# Patient Record
Sex: FEMALE | Race: WHITE | Employment: OTHER | URBAN - NONMETROPOLITAN AREA
[De-identification: names, ages, dates, MRNs, and addresses within clinical notes are randomized per-mention and may not be internally consistent; named-entity substitution may affect disease eponyms.]

---

## 2017-12-01 ENCOUNTER — HOSPITAL ENCOUNTER (EMERGENCY)
Age: 62
Discharge: HOME | End: 2017-12-01
Attending: EMERGENCY MEDICINE
Payer: COMMERCIAL

## 2017-12-01 ENCOUNTER — APPOINTMENT (OUTPATIENT)
Dept: GENERAL RADIOLOGY | Age: 62
End: 2017-12-01
Payer: COMMERCIAL

## 2017-12-01 VITALS
OXYGEN SATURATION: 99 % | TEMPERATURE: 98 F | SYSTOLIC BLOOD PRESSURE: 148 MMHG | DIASTOLIC BLOOD PRESSURE: 92 MMHG | RESPIRATION RATE: 18 BRPM | WEIGHT: 200 LBS | HEART RATE: 84 BPM

## 2017-12-01 DIAGNOSIS — I10 ESSENTIAL HYPERTENSION: ICD-10-CM

## 2017-12-01 DIAGNOSIS — S82.402A CLOSED FRACTURE OF SHAFT OF LEFT FIBULA, UNSPECIFIED FRACTURE MORPHOLOGY, INITIAL ENCOUNTER: Primary | ICD-10-CM

## 2017-12-01 PROCEDURE — 99283 EMERGENCY DEPT VISIT LOW MDM: CPT

## 2017-12-01 PROCEDURE — 6360000002 HC RX W HCPCS: Performed by: EMERGENCY MEDICINE

## 2017-12-01 PROCEDURE — 73610 X-RAY EXAM OF ANKLE: CPT

## 2017-12-01 PROCEDURE — 90715 TDAP VACCINE 7 YRS/> IM: CPT | Performed by: EMERGENCY MEDICINE

## 2017-12-01 PROCEDURE — 90471 IMMUNIZATION ADMIN: CPT | Performed by: EMERGENCY MEDICINE

## 2017-12-01 RX ORDER — VENLAFAXINE HYDROCHLORIDE 37.5 MG/1
37.5 CAPSULE, EXTENDED RELEASE ORAL DAILY
COMMUNITY

## 2017-12-01 RX ORDER — VALACYCLOVIR HYDROCHLORIDE 500 MG/1
500 TABLET, FILM COATED ORAL DAILY
COMMUNITY

## 2017-12-01 RX ORDER — HYDROCODONE BITARTRATE AND ACETAMINOPHEN 5; 325 MG/1; MG/1
1 TABLET ORAL EVERY 6 HOURS PRN
Qty: 10 TABLET | Refills: 0 | Status: SHIPPED | OUTPATIENT
Start: 2017-12-01 | End: 2017-12-08

## 2017-12-01 RX ORDER — CYCLOBENZAPRINE HCL 10 MG
10 TABLET ORAL DAILY
COMMUNITY

## 2017-12-01 RX ORDER — NAPROXEN 500 MG/1
500 TABLET ORAL 2 TIMES DAILY WITH MEALS
COMMUNITY

## 2017-12-01 RX ADMIN — TETANUS TOXOID, REDUCED DIPHTHERIA TOXOID AND ACELLULAR PERTUSSIS VACCINE, ADSORBED 0.5 ML: 5; 2.5; 8; 8; 2.5 SUSPENSION INTRAMUSCULAR at 08:36

## 2017-12-01 ASSESSMENT — PAIN DESCRIPTION - FREQUENCY: FREQUENCY: CONTINUOUS

## 2017-12-01 ASSESSMENT — PAIN DESCRIPTION - ORIENTATION: ORIENTATION: LEFT

## 2017-12-01 ASSESSMENT — PAIN SCALES - GENERAL: PAINLEVEL_OUTOF10: 10

## 2017-12-01 ASSESSMENT — PAIN DESCRIPTION - PAIN TYPE: TYPE: ACUTE PAIN

## 2017-12-01 ASSESSMENT — PAIN DESCRIPTION - LOCATION: LOCATION: ANKLE

## 2017-12-01 ASSESSMENT — PAIN DESCRIPTION - DESCRIPTORS: DESCRIPTORS: ACHING;SHARP

## 2017-12-01 NOTE — ED PROVIDER NOTES
CURRENT MEDICATIONS       Previous Medications    No medications on file       ALLERGIES     Review of patient's allergies indicates no known allergies. FAMILY HISTORY     History reviewed. No pertinent family history. SOCIAL HISTORY       Social History     Social History    Marital status: N/A     Spouse name: N/A    Number of children: N/A    Years of education: N/A     Social History Main Topics    Smoking status: Never Smoker    Smokeless tobacco: Never Used    Alcohol use Yes      Comment: social    Drug use: No    Sexual activity: Not Asked     Other Topics Concern    None     Social History Narrative    None       SCREENINGS             PHYSICAL EXAM    (up to 7 for level 4, 8 or more for level 5)     ED Triage Vitals [12/01/17 0816]   BP Temp Temp Source Pulse Resp SpO2 Height Weight   (!) 182/98 98 °F (36.7 °C) Tympanic 84 18 99 % -- 200 lb (90.7 kg)       Physical Exam  Reveals an alert white female whose blood pressure is elevated at 182/98. Other vital signs are stable. She has not had injury about her head or neck. This is superficial abrasion to the right forearm in the area of the mid ulna. No tenderness or swelling over the elbow or wrist.  Good range of motion is present. Left ankle demonstrates some bruising anteriorly as well as over the lateral malleolus. She has some minor tenderness over the medial malleolus. Achilles tendon and calcaneus are normal.  She does not have tenderness over the foot including the proximal fifth metatarsal.  Neurovascular function is normal.  No injury or significant tenderness over the left knee or left hip. I don't feel x-rays of the left ankle are needed. Ice will be applied patient was given a tetanus shot is greater than 10 years since her last one.   DIAGNOSTIC RESULTS     EKG: All EKG's are interpreted by the Emergency Department Physician who either signs or Co-signs this chart in the absence of a cardiologist.    No EKGs indicated    RADIOLOGY:   Non-plain film images such as CT, Ultrasound and MRI are read by the radiologist. Plain radiographic images are visualized and preliminarily interpreted by the emergency physician with the below findings:    X-ray of the left ankle was read by the radiologist and demonstrated nondisplaced fracture of the distal fibula. I reviewed the films as well. Interpretation per the Radiologist below, if available at the time of this note:    XR ANKLE LEFT (MIN 3 VIEWS)    (Results Pending)         ED BEDSIDE ULTRASOUND:   Performed by ED Physician - none    LABS:  Labs Reviewed - No data to display    All other labs were within normal range or not returned as of this dictation. EMERGENCY DEPARTMENT COURSE and DIFFERENTIAL DIAGNOSIS/MDM:   Vitals:    Vitals:    12/01/17 0816   BP: (!) 182/98   Pulse: 84   Resp: 18   Temp: 98 °F (36.7 °C)   TempSrc: Tympanic   SpO2: 99%   Weight: 200 lb (90.7 kg)       Patient presents with a nondisplaced fracture of her distal fibula. Dr. Roopa Kerr is on call and the patient is fine following up with him. I will place the patient in a posterior splint here was to be applied by the nurses. She is given crutches as well. She was sent home with Vicodin to use for pain. She is given a note for work as her job entails standing all day long. [facility]  ED Course          CONSULTS:  None    PROCEDURES:A posterior splint was applied to the patient's left lower extremities by the nurses. Neurovascular function was intact at discharge. Unless otherwise noted below, none     Procedures    FINAL IMPRESSION    Acute nondisplaced fracture distal fibula  Abrasion of right forearm      DISPOSITION/PLAN   DISPOSITION     PATIENT REFERRED TO:  No follow-up provider specified.     DISCHARGE MEDICATIONS:  New Prescriptions    No medications on file              Summation      Patient Course:  See above H&P    ED Medications administered this visit:  Medications - No data to display    New Prescriptions from this visit:    New Prescriptions    No medications on file       Follow-up:  No follow-up provider specified.       Final Impression: See above diagnosis           (Please note that portions of this note were completed with a voice recognition program.  Efforts were made to edit the dictations but occasionally words are mis-transcribed.)      Natalie Max MD (electronically signed)  Attending Emergency Physician         Natalie Martin MD  12/01/17 1037

## 2017-12-01 NOTE — LETTER
[facility]  [address] [address]  Phone: [phone]  Fax: [phone]             December 1, 2017    Patient: Liliana Marcano   YOB: 1955   Date of Visit: 12/1/2017       To Whom It May Concern:    Liliana Marcano was seen and treated in our emergency department on 12/1/2017. She is to be off work until cleared by an orthopedic surgeon.       Sincerely,             Signature:__________________________________

## 2021-11-29 ENCOUNTER — OFFICE VISIT (OUTPATIENT)
Dept: CARDIOLOGY | Age: 66
End: 2021-11-29
Payer: MEDICARE

## 2021-11-29 ENCOUNTER — HOSPITAL ENCOUNTER (OUTPATIENT)
Dept: NON INVASIVE DIAGNOSTICS | Age: 66
Discharge: HOME | End: 2021-11-29
Payer: MEDICARE

## 2021-11-29 ENCOUNTER — HOSPITAL ENCOUNTER (OUTPATIENT)
Age: 66
Discharge: HOME | End: 2021-11-29
Payer: MEDICARE

## 2021-11-29 VITALS
RESPIRATION RATE: 16 BRPM | SYSTOLIC BLOOD PRESSURE: 126 MMHG | OXYGEN SATURATION: 98 % | DIASTOLIC BLOOD PRESSURE: 78 MMHG | WEIGHT: 207.4 LBS | HEIGHT: 66 IN | BODY MASS INDEX: 33.33 KG/M2 | HEART RATE: 69 BPM

## 2021-11-29 DIAGNOSIS — Z79.01 CHRONIC ANTICOAGULATION: ICD-10-CM

## 2021-11-29 DIAGNOSIS — I48.0 PAF (PAROXYSMAL ATRIAL FIBRILLATION) (HCC): Primary | ICD-10-CM

## 2021-11-29 DIAGNOSIS — I10 PRIMARY HYPERTENSION: ICD-10-CM

## 2021-11-29 DIAGNOSIS — E78.2 MIXED HYPERLIPIDEMIA: ICD-10-CM

## 2021-11-29 DIAGNOSIS — G47.33 OSA (OBSTRUCTIVE SLEEP APNEA): ICD-10-CM

## 2021-11-29 DIAGNOSIS — I48.91: ICD-10-CM

## 2021-11-29 DIAGNOSIS — R94.39 ABNORMAL STRESS TEST: ICD-10-CM

## 2021-11-29 PROBLEM — R53.83 OTHER FATIGUE: Status: ACTIVE | Noted: 2021-08-30

## 2021-11-29 PROBLEM — Z92.89 HISTORY OF ECHOCARDIOGRAM: Status: ACTIVE | Noted: 2021-07-01

## 2021-11-29 PROBLEM — R09.81 NASAL CONGESTION: Status: ACTIVE | Noted: 2019-03-18

## 2021-11-29 PROBLEM — H93.13 TINNITUS AURIUM, BILATERAL: Status: ACTIVE | Noted: 2019-03-18

## 2021-11-29 PROBLEM — E66.09 CLASS 1 OBESITY DUE TO EXCESS CALORIES WITH BODY MASS INDEX (BMI) OF 33.0 TO 33.9 IN ADULT: Status: ACTIVE | Noted: 2021-11-29

## 2021-11-29 PROBLEM — H69.83 DYSFUNCTION OF BOTH EUSTACHIAN TUBES: Status: ACTIVE | Noted: 2019-03-18

## 2021-11-29 PROBLEM — E03.9 HYPOTHYROIDISM: Status: ACTIVE | Noted: 2021-11-29

## 2021-11-29 PROBLEM — H69.93 DYSFUNCTION OF BOTH EUSTACHIAN TUBES: Status: ACTIVE | Noted: 2019-03-18

## 2021-11-29 PROBLEM — H91.22 SUDDEN HEARING LOSS, LEFT: Status: ACTIVE | Noted: 2019-04-02

## 2021-11-29 PROBLEM — Z87.891 FORMER SMOKER: Status: ACTIVE | Noted: 2021-11-29

## 2021-11-29 PROBLEM — E66.811 CLASS 1 OBESITY DUE TO EXCESS CALORIES WITH BODY MASS INDEX (BMI) OF 33.0 TO 33.9 IN ADULT: Status: ACTIVE | Noted: 2021-11-29

## 2021-11-29 PROBLEM — Z92.89 HISTORY OF STRESS TEST: Status: ACTIVE | Noted: 2021-07-01

## 2021-11-29 PROBLEM — R42 DIZZINESS: Status: ACTIVE | Noted: 2019-04-02

## 2021-11-29 PROBLEM — M79.7 FIBROMYALGIA: Status: ACTIVE | Noted: 2021-11-29

## 2021-11-29 LAB
ALBUMIN SERPL-MCNC: 4.3 G/DL (ref 3.5–5.2)
ALBUMIN/GLOBULIN RATIO: 1.3 (ref 1–2.5)
ALP BLD-CCNC: 88 U/L (ref 35–104)
ALT SERPL-CCNC: 25 U/L (ref 5–33)
ANION GAP SERPL CALCULATED.3IONS-SCNC: 14 MMOL/L (ref 9–17)
AST SERPL-CCNC: 22 U/L
BILIRUB SERPL-MCNC: 0.45 MG/DL (ref 0.3–1.2)
BILIRUBIN DIRECT: <0.08 MG/DL
BILIRUBIN, INDIRECT: NORMAL MG/DL (ref 0–1)
BUN BLDV-MCNC: 20 MG/DL (ref 8–23)
BUN/CREAT BLD: 18 (ref 9–20)
CALCIUM SERPL-MCNC: 9.5 MG/DL (ref 8.6–10.4)
CHLORIDE BLD-SCNC: 101 MMOL/L (ref 98–107)
CO2: 21 MMOL/L (ref 20–31)
CREAT SERPL-MCNC: 1.1 MG/DL (ref 0.5–0.9)
GFR AFRICAN AMERICAN: >60 ML/MIN
GFR NON-AFRICAN AMERICAN: 50 ML/MIN
GFR SERPL CREATININE-BSD FRML MDRD: ABNORMAL ML/MIN/{1.73_M2}
GFR SERPL CREATININE-BSD FRML MDRD: ABNORMAL ML/MIN/{1.73_M2}
GLOBULIN: NORMAL G/DL (ref 1.5–3.8)
GLUCOSE BLD-MCNC: 105 MG/DL (ref 70–99)
POTASSIUM SERPL-SCNC: 3.9 MMOL/L (ref 3.7–5.3)
SODIUM BLD-SCNC: 136 MMOL/L (ref 135–144)
TOTAL PROTEIN: 7.5 G/DL (ref 6.4–8.3)

## 2021-11-29 PROCEDURE — 80076 HEPATIC FUNCTION PANEL: CPT

## 2021-11-29 PROCEDURE — 93242 EXT ECG>48HR<7D RECORDING: CPT

## 2021-11-29 PROCEDURE — 36415 COLL VENOUS BLD VENIPUNCTURE: CPT

## 2021-11-29 PROCEDURE — 80061 LIPID PANEL: CPT

## 2021-11-29 PROCEDURE — G8427 DOCREV CUR MEDS BY ELIG CLIN: HCPCS | Performed by: INTERNAL MEDICINE

## 2021-11-29 PROCEDURE — 93010 ELECTROCARDIOGRAM REPORT: CPT | Performed by: INTERNAL MEDICINE

## 2021-11-29 PROCEDURE — 93005 ELECTROCARDIOGRAM TRACING: CPT | Performed by: INTERNAL MEDICINE

## 2021-11-29 PROCEDURE — 99203 OFFICE O/P NEW LOW 30 MIN: CPT | Performed by: INTERNAL MEDICINE

## 2021-11-29 PROCEDURE — G8484 FLU IMMUNIZE NO ADMIN: HCPCS | Performed by: INTERNAL MEDICINE

## 2021-11-29 PROCEDURE — G8417 CALC BMI ABV UP PARAM F/U: HCPCS | Performed by: INTERNAL MEDICINE

## 2021-11-29 PROCEDURE — 80048 BASIC METABOLIC PNL TOTAL CA: CPT

## 2021-11-29 PROCEDURE — 1090F PRES/ABSN URINE INCON ASSESS: CPT | Performed by: INTERNAL MEDICINE

## 2021-11-29 PROCEDURE — 93243 EXT ECG>48HR<7D SCAN A/R: CPT

## 2021-11-29 RX ORDER — M-VIT,TX,IRON,MINS/CALC/FOLIC 27MG-0.4MG
1 TABLET ORAL DAILY
COMMUNITY

## 2021-11-29 RX ORDER — ATORVASTATIN CALCIUM 20 MG/1
20 TABLET, FILM COATED ORAL DAILY
COMMUNITY
Start: 2021-08-30 | End: 2022-05-24 | Stop reason: SDUPTHER

## 2021-11-29 RX ORDER — GLUCOSAMINE SULFATE 500 MG
500 CAPSULE ORAL 2 TIMES DAILY
COMMUNITY

## 2021-11-29 RX ORDER — LANOLIN ALCOHOL/MO/W.PET/CERES
1000 CREAM (GRAM) TOPICAL DAILY
COMMUNITY

## 2021-11-29 RX ORDER — DIMENHYDRINATE 50 MG
1000 TABLET ORAL DAILY
COMMUNITY

## 2021-11-29 RX ORDER — OMEPRAZOLE 20 MG/1
20 CAPSULE, DELAYED RELEASE ORAL DAILY
COMMUNITY

## 2021-11-29 RX ORDER — AMOXICILLIN 500 MG
CAPSULE ORAL
COMMUNITY

## 2021-11-29 RX ORDER — MAGNESIUM OXIDE 400 MG/1
400 TABLET ORAL 2 TIMES DAILY
COMMUNITY
Start: 2021-10-13 | End: 2022-05-24 | Stop reason: SDUPTHER

## 2021-11-29 RX ORDER — AMPICILLIN TRIHYDRATE 500 MG
CAPSULE ORAL
COMMUNITY

## 2021-11-29 RX ORDER — APIXABAN 5 MG/1
5 TABLET, FILM COATED ORAL 2 TIMES DAILY
COMMUNITY
Start: 2021-11-02 | End: 2022-05-24 | Stop reason: SDUPTHER

## 2021-11-29 RX ORDER — DILTIAZEM HYDROCHLORIDE 120 MG/1
120 CAPSULE, COATED, EXTENDED RELEASE ORAL 2 TIMES DAILY
COMMUNITY
Start: 2021-11-01 | End: 2022-05-24 | Stop reason: SDUPTHER

## 2021-11-29 RX ORDER — LORATADINE 10 MG/1
10 TABLET, ORALLY DISINTEGRATING ORAL DAILY
COMMUNITY
Start: 2021-07-12

## 2021-11-29 RX ORDER — POTASSIUM CHLORIDE 20 MEQ/1
20 TABLET, EXTENDED RELEASE ORAL DAILY
COMMUNITY
Start: 2021-07-30 | End: 2022-05-24 | Stop reason: SDUPTHER

## 2021-11-29 NOTE — PROGRESS NOTES
Christian Schrader MA am scribing for and in the presence of Ant Wood MD.    Patient: Grzegorz Rivers  : 1955  Date of Visit: 2021    REASON FOR VISIT / CONSULTATION: Establish Cardiologist (Previously seen in [place]. Hx:PAF(found out 2021),BRAULIO,HLD. She has been feeling good. Denies: CP, SOB, Lightheaded/dizziness, Palpitations. )      History of Present Illness:        Dear David Hollingsworth MD    I had the pleasure of seeing Grzegorz Rivers today. Ms. Asher Loredo is a 77 y.o. female  with a history of atrial fibrillation 1st diagnosed around 2021. She does not feel when she is in atrial fibrillation, no symptoms. She said she went for colonoscopy back in 2021 and was found to be in atrial fibrillation but converted to sinus rhythm spontaneously. She did have a stress and echo in 2021. I reviewed the results and apparently her stress test is mildly abnormal showing a small distal anterior ischemia. Because she is asymptomatic she is treated medically with Lipitor, Metoprolol, Diltiazem and eliquis. She does have sleep apnea that she was diagnosed with over the summer. AHI is 28 consistent with moderate obstructive sleep apnea syndrome. She had a  CPAP titration study done and is waiting for the CPAP machine because it is on back order. Family cardiac history includes her mother who had CHF in her late 63's, early 66's. Her sister and brother have atrial fibrillation. She is a former smoker, social smoker. She quit about 25 years ago. She smoked maybe a pack every 2 weeks for 20 years or so. She does have a history of syncope many many years ago. No reoccurrence. EKG done today in office on 2021: Sinus rhythm with PACs, no acute ischemic changes. Ms. Asher Loredo is here today to establish care. She previously followed with [facility] in [place]. She is overall feeling well.  She does have some tiredness but doesn't know if it is related to her sleep apnea. She does have some lightheaded/dizziness if she stands too quick. She drinks plenty of water and doesn't drink caffeine anymore. She denied any current or recent chest pain, pressure or tightness. She denies having any shortness of breath or palpitations. No abdominal pain, nausea or vomiting. No bleeding problems, bowel issues, problems with her medications or any other concerns at this time. Exercise Tolerance: Ms. Lloyd Watson reports that she has a fairly good exercise tolerance. Her says that she could walk 1 mile without developing chest discomfort or significant shortness of breath. PAST MEDICAL HISTORY:        Past Medical History:   Diagnosis Date    Class 1 obesity due to excess calories with body mass index (BMI) of 33.0 to 33.9 in adult     Fibromyalgia     Fibromyalgia     Former smoker     History of echocardiogram 07/2021    History of stress test 07/2021    Hypothyroidism     Mixed hyperlipidemia     BRAULIO (obstructive sleep apnea)     PAF [facility]oxysmal atrial fibrillation) ([facility] [address]     Primary hypertension        CURRENT ALLERGIES: Patient has no known allergies. REVIEW OF SYSTEMS: 14 systems were reviewed. Pertinent positives and negatives as above, all else negative.      Past Surgical History:   Procedure Laterality Date    HAND SURGERY      carpel tiunnel    ROTATOR CUFF REPAIR      TUBAL LIGATION      Social History:  Social History     Tobacco Use    Smoking status: Former Smoker    Smokeless tobacco: Never Used   Substance Use Topics    Alcohol use: Yes     Comment: social    Drug use: No        CURRENT MEDICATIONS:        Outpatient Medications Marked as Taking for the 11/29/21 encounter (Office Visit) with Max Tavares MD   Medication Sig Dispense Refill    atorvastatin (LIPITOR) 20 MG tablet Take 20 mg by mouth daily      ELIQUIS 5 MG TABS tablet Take 5 mg by mouth 2 times daily      dilTIAZem (CARDIZEM CD) 120 MG extended release capsule Take 120 mg by mouth 2 times daily       magnesium oxide (MAG-OX) 400 MG tablet Take 400 mg by mouth 2 times daily       metoprolol tartrate (LOPRESSOR) 25 MG tablet Take 25 mg by mouth 2 times daily      potassium chloride (KLOR-CON M) 20 MEQ extended release tablet Take 20 mEq by mouth daily      omeprazole (PRILOSEC) 20 MG delayed release capsule Take 20 mg by mouth daily      loratadine (CLARITIN REDITABS) 10 MG dissolvable tablet Take 10 mg by mouth daily      Multiple Vitamins-Minerals (THERAPEUTIC MULTIVITAMIN-MINERALS) tablet Take 1 tablet by mouth daily      Omega-3 Fatty Acids (FISH OIL) 1200 MG CAPS Take by mouth      vitamin B-12 (CYANOCOBALAMIN) 1000 MCG tablet Take 1,000 mcg by mouth daily      Flaxseed, Linseed, (FLAX SEED OIL) 1000 MG CAPS Take 1,000 mg by mouth daily      Cranberry 450 MG CAPS Take by mouth      Glucosamine 500 MG CAPS Take 500 mg by mouth 2 times daily      Chondroitin Sulfate 400 MG CAPS Take by mouth      naproxen (NAPROSYN) 500 MG tablet Take 500 mg by mouth 2 times daily (with meals)      valACYclovir (VALTREX) 500 MG tablet Take 500 mg by mouth daily      venlafaxine (EFFEXOR XR) 37.5 MG extended release capsule Take 37.5 mg by mouth daily      cyclobenzaprine (FLEXERIL) 10 MG tablet Take 10 mg by mouth daily         FAMILY HISTORY: reviewed and noncontributory, see HPI     Physical Examination:     /78 (Site: Left Upper Arm, Position: Sitting, Cuff Size: Large Adult)   Pulse 69   Resp 16   Ht 5' 6\" (1.676 m)   Wt 207 lb 6.4 oz (94.1 kg)   SpO2 98%   BMI 33.48 kg/m²  Body mass index is 33.48 kg/m². Constitutional: She is oriented to person, place, and time. She appears well-developed and well-nourished. In no acute distress. HEENT: Normocephalic and atraumatic. No JVD present. Carotid bruit is not present. No mass and no thyromegaly present. No lymphadenopathy present.   Cardiovascular: Normal rate, regularly irregular rhythm, normal heart sounds. Exam reveals no gallop and no friction rubs. No murmur was heard. .  Pulmonary/Chest: Effort normal and breath sounds normal. No respiratory distress. She has no wheezes, rhonchi or rales. Abdominal: Soft, non-tender. Bowel sounds and aorta are normal. She exhibits no organomegaly, mass or bruit. Extremities: No edema. No cyanosis or clubbing. 2+ radial and carotid pulses. Distal extremity pulses: 2+ bilaterally. Neurological: She is alert and oriented to person, place, and time. No evidence of gross cranial nerve deficit. Coordination appeared normal.   Skin: Skin is warm and dry. There is no rash or diaphoresis. Psychiatric: She has a normal mood and affect. Her speech is normal and behavior is normal.        MOST RECENT LABS ON RECORD:   Reviewed blood work at care everywhere. ASSESSMENT:     1. PAF (paroxysmal atrial fibrillation) ([facility] [address]    2. Chronic anticoagulation    3. Primary hypertension    4. Mixed hyperlipidemia    5. BRAULIO (obstructive sleep apnea)    6. Abnormal stress test    7. Familial atrial fibrillation (HCC)       PLAN:        · Paroxysmal Atrial Fibrillation: Currently maintaining sinus rhythm. · Discussed in details possible etiology of atrial fibrillation. · Discussed rate control versus rhythm control. · Discussed the need for anticoagulation based on her CHADS-Vasc score.  Beta Blocker: Continue Metoprolol tartrate (Lopressor) 25 mg bid.  Calcium Channel Blocker: Continue diltiazem CD (Cardizem CD) 120 mg 2 times daily.  Anti-Arrhythmic: Not indicated.   [id number] Score for Atrial Fibrillation Stroke Risk   Risk   Factors  Component Value   C CHF No 0   H HTN No 0   A2 Age >= 75 No,  (68 y.o.) 0   D DM No 0   S2 Prior Stroke/TIA No 0   V Vascular Disease No 0   A Age 74-69 Yes,  (68 y.o.) 1   Sc Sex female 1    [id number]  Score  2   Score last updated 65/39/28 7:80 PM EST  Click here for a link to the UpToDate guideline \"Atrial Fibrillation: Anticoagulation therapy to prevent embolization  Disclaimer: Risk Score calculation is dependent on accuracy of patient problem list and past encounter diagnosis.  Stroke Risk: CHADS2-VASc Score: 2/9 (2.2% stroke risk)   Anticoagulation: Continue Apixaban (Eliquis) 5 mg every 12 hours.  Additional Testing List: I ordered an EVENT MONITOR to try and pinpoint the etiology of their symptoms      Atherosclerotic Heart Disease: Mildly abnormal stress test    Currently asymptomatic.  Overall low risk stress test based on the report from Jesus Roldan.   Antiplatelet Agent: Not indicated at this time. Patient on Eliquis.  Beta Blocker: Continue Metoprolol tartrate (Lopressor) 25 mg bid.  Anti-anginal medications: Continue diltiazem CD (Cardizem CD) 120 mg once daily.  Cholesterol Reduction Therapy: Continue Atorvastatin (Lipitor) 20 mg daily. I discussed the potential benefits of statin therapy as well as the potential risks including myalgia as well as the rare but potentially serious complication of liver or kidney damage. Although rare, I told them that this could be serious and therefore told them to stop the medication immediately and call if they developed any severe muscle aches or pains and they agreed to do so.  Additional counseling: I advised them to call our office or go to the emergency room if they developed worsening or persistent chest pain or increased shortness of breath as this could be life threatening. · Essential Hypertension: Controlled   Beta Blocker: Continue Metoprolol tartrate (Lopressor) 25 mg bid.  ACE Inibitor/ARB: Not indicated at this time.  Calcium Channel Blocker: Continue diltiazem CD (Cardizem CD) 120 mg 2 times daily.  Diuretics: Not indicated at this time. · Hyperlipidemia: Mixed   · Cholesterol Reduction Therapy: Continue Atorvastatin (Lipitor) 20 mg daily.  I discussed the potential benefits of statin therapy as well as the potential risks including myalgia as well as the rare but potentially serious complication of liver or kidney damage. Although rare, I told them that this could be serious and therefore told them to stop the medication immediately and call if they developed any severe muscle aches or pains and they agreed to do so. · Will follow up LDLC done today. Obesity: Body mass index is 33.48 kg/m². I also briefly discussed both diet and exercise strategies for her to continue to loses weight and she was very receptive to this.  Obstructive Sleep Apnea:   o Moderate BRAULIO syndrome with AHI of 28  o Still awaiting her CPAP machine since August because it is on back order. In the meantime, I encouraged Ms. Brannon to continue to take her other medications. FOLLOW UP:   I told Ms. Brannon to call my office if she had any problems, but otherwise I asked her to Return in about 3 months (around 2/28/2022). However, I would be happy to see her sooner should the need arise. Sincerely,  Rosie Santana MD, F.A.C.C. [facility] Cardiology Specialist     Place [address]  Phone: [phone], Fax: [phone]     I believe that the risk of significant morbidity and mortality related to the patient's current medical conditions are: intermediate-high. >45 minutes were spent during prep work, discussion and exam of the patient, and follow up documentation and all of their questions were answered. The documentation recorded by the scribe, accurately and completely reflects the services I personally performed and the decisions made by me. Rosie Santana MD, F.A.C.C.  November 29, 2021

## 2021-11-30 LAB
CHOLESTEROL/HDL RATIO: 3.9
CHOLESTEROL: 185 MG/DL
HDLC SERPL-MCNC: 48 MG/DL
LDL CHOLESTEROL: 98 MG/DL (ref 0–130)
TRIGL SERPL-MCNC: 193 MG/DL
VLDLC SERPL CALC-MCNC: ABNORMAL MG/DL (ref 1–30)

## 2022-03-01 ENCOUNTER — OFFICE VISIT (OUTPATIENT)
Dept: CARDIOLOGY | Age: 67
End: 2022-03-01
Payer: MEDICARE

## 2022-03-01 VITALS
DIASTOLIC BLOOD PRESSURE: 72 MMHG | RESPIRATION RATE: 18 BRPM | BODY MASS INDEX: 34.87 KG/M2 | HEIGHT: 66 IN | WEIGHT: 217 LBS | OXYGEN SATURATION: 96 % | HEART RATE: 73 BPM | SYSTOLIC BLOOD PRESSURE: 119 MMHG

## 2022-03-01 DIAGNOSIS — I10 PRIMARY HYPERTENSION: ICD-10-CM

## 2022-03-01 DIAGNOSIS — I48.0 PAF (PAROXYSMAL ATRIAL FIBRILLATION) (HCC): Primary | ICD-10-CM

## 2022-03-01 DIAGNOSIS — Z79.01 CHRONIC ANTICOAGULATION: ICD-10-CM

## 2022-03-01 DIAGNOSIS — G47.33 OSA (OBSTRUCTIVE SLEEP APNEA): ICD-10-CM

## 2022-03-01 DIAGNOSIS — E78.2 MIXED HYPERLIPIDEMIA: ICD-10-CM

## 2022-03-01 PROCEDURE — G8417 CALC BMI ABV UP PARAM F/U: HCPCS | Performed by: INTERNAL MEDICINE

## 2022-03-01 PROCEDURE — 1090F PRES/ABSN URINE INCON ASSESS: CPT | Performed by: INTERNAL MEDICINE

## 2022-03-01 PROCEDURE — G8400 PT W/DXA NO RESULTS DOC: HCPCS | Performed by: INTERNAL MEDICINE

## 2022-03-01 PROCEDURE — 1123F ACP DISCUSS/DSCN MKR DOCD: CPT | Performed by: INTERNAL MEDICINE

## 2022-03-01 PROCEDURE — 1036F TOBACCO NON-USER: CPT | Performed by: INTERNAL MEDICINE

## 2022-03-01 PROCEDURE — 3017F COLORECTAL CA SCREEN DOC REV: CPT | Performed by: INTERNAL MEDICINE

## 2022-03-01 PROCEDURE — 99211 OFF/OP EST MAY X REQ PHY/QHP: CPT | Performed by: INTERNAL MEDICINE

## 2022-03-01 PROCEDURE — G8484 FLU IMMUNIZE NO ADMIN: HCPCS | Performed by: INTERNAL MEDICINE

## 2022-03-01 PROCEDURE — 4040F PNEUMOC VAC/ADMIN/RCVD: CPT | Performed by: INTERNAL MEDICINE

## 2022-03-01 PROCEDURE — G8427 DOCREV CUR MEDS BY ELIG CLIN: HCPCS | Performed by: INTERNAL MEDICINE

## 2022-03-01 PROCEDURE — 99213 OFFICE O/P EST LOW 20 MIN: CPT | Performed by: INTERNAL MEDICINE

## 2022-03-01 RX ORDER — LEVOTHYROXINE SODIUM 0.07 MG/1
75 TABLET ORAL DAILY
COMMUNITY
Start: 2022-02-21

## 2022-03-01 NOTE — PROGRESS NOTES
Patient: Robles Levine  : 1955  Date of Visit: 2022    REASON FOR VISIT / CONSULTATION: Follow-up (Hx: PAF, HTN, HLD, BRAULIO. Pt is here fr 3 month. doing okay, no palps. got a cardiomobile. SOb when she wears her CPAP machine Denies: CP, dizziness, lightheaded)      History of Present Illness:        Dear Vijaya Yoo MD    I had the pleasure of seeing Robles Levine today. Ms. Vikas Lloyd is a 77 y.o. female  with a history of atrial fibrillation 1st diagnosed around 2021. She does not feel when she is in atrial fibrillation, no symptoms. She said she went for colonoscopy back in 2021 and was found to be in atrial fibrillation but converted to sinus rhythm spontaneously. She did have a stress and echo in 2021. I reviewed the results and apparently her stress test is mildly abnormal showing a small distal anterior ischemia. Because she was asymptomatic she is treated medically with Lipitor, Metoprolol, Diltiazem and eliquis. She does have sleep apnea. AHI is 28 consistent with moderate obstructive sleep apnea syndrome. Currently using CPAP. Family cardiac history includes her mother who had CHF in her late 63's, early 66's. Her sister and brother have atrial fibrillation. She is a former smoker, social smoker. She quit about 25 years ago. She smoked maybe a pack every 2 weeks for 20 years or so. She does have a history of syncope many many years ago. No reoccurrence. EKG done in office on 2021: Sinus rhythm with PACs, no acute ischemic changes. CAM monitor on 2021- 11 days recorded. Average  HR during sinus rhythm is 82 bpm, ranging between 53 and 121 bpm. Sinus tachycardiac 155 of the time. atrial fibrillation 2.75, longest episode is 2.4 hours with an average heart rate of 132 bpm. Atrial flutter with variable block 2.25 with an average  bpm.    Ms. Vikas Lloyd is here today for a follow up.  She has had no emergency room visits, hospital stays or minor office procedures. She does have shortness of breath while wearing her CPAP machine. But she is still using it daily. She admits to not being very active throughout the day. She did say she is using a cardiomobile to assess for any atrial fibrillation. Her shortness of breath staying the same. No chest pain. No chest pressure or tightness. No palpitations, dizziness or lightheadedness. I did go over the results of her most recent CAM monitor and I explained that she gets intermittent episodes of atrial fibrillation and atrial flutter the longest reported episode lasted for about 2.4 hours. She does not feel her palpitations. No dizziness or lightheadedness. No problems with her current medication but she thinks that she is taking too many pills. Discussed with her and her  starting her on antiarrhythmic therapy but patient states she is asymptomatic. We decided to continue rate control/conservative management and reevaluate her symptoms and consider antiarrhythmic therapy plus or minus ablation on follow-up. PAST MEDICAL HISTORY:        Past Medical History:   Diagnosis Date    Class 1 obesity due to excess calories with body mass index (BMI) of 33.0 to 33.9 in adult     Fibromyalgia     Fibromyalgia     Former smoker     History of echocardiogram 07/2021    History of stress test 07/2021    Hypothyroidism     Mixed hyperlipidemia     BRAULIO (obstructive sleep apnea)     PAF [facility]oxysmal [facility] fibrillation) ([facility] [address]     Primary hypertension        CURRENT ALLERGIES: Patient has no known allergies. REVIEW OF SYSTEMS: 14 systems were reviewed. Pertinent positives and negatives as above, all else negative.      Past Surgical History:   Procedure Laterality Date    COLONOSCOPY  2009    HAND SURGERY  2011    carpel tunnel    ROTATOR CUFF REPAIR  2011    TONSILLECTOMY      TUBAL LIGATION      Social History:  Social History     Tobacco Use    Smoking status: Former Smoker     Packs/day: 0.25     Years: 20.00     Pack years: 5.00     Quit date:      Years since quittin.1    Smokeless tobacco: Never Used    Tobacco comment: smoked 1 pack every 2 weeks   Substance Use Topics    Alcohol use: Yes     Comment: social    Drug use: No        CURRENT MEDICATIONS:        Outpatient Medications Marked as Taking for the 3/1/22 encounter (Office Visit) with Andersno Barber MD   Medication Sig Dispense Refill    levothyroxine (SYNTHROID) 75 MCG tablet Take 75 mcg by mouth daily      atorvastatin (LIPITOR) 20 MG tablet Take 20 mg by mouth daily      ELIQUIS 5 MG TABS tablet Take 5 mg by mouth 2 times daily      dilTIAZem (CARDIZEM CD) 120 MG extended release capsule Take 120 mg by mouth 2 times daily       magnesium oxide (MAG-OX) 400 MG tablet Take 400 mg by mouth 2 times daily       metoprolol tartrate (LOPRESSOR) 25 MG tablet Take 25 mg by mouth 2 times daily      potassium chloride (KLOR-CON M) 20 MEQ extended release tablet Take 20 mEq by mouth daily      omeprazole (PRILOSEC) 20 MG delayed release capsule Take 20 mg by mouth daily      loratadine (CLARITIN REDITABS) 10 MG dissolvable tablet Take 10 mg by mouth daily      Multiple Vitamins-Minerals (THERAPEUTIC MULTIVITAMIN-MINERALS) tablet Take 1 tablet by mouth daily      Omega-3 Fatty Acids (FISH OIL) 1200 MG CAPS Take by mouth      vitamin B-12 (CYANOCOBALAMIN) 1000 MCG tablet Take 1,000 mcg by mouth daily      Flaxseed, Linseed, (FLAX SEED OIL) 1000 MG CAPS Take 1,000 mg by mouth daily      Cranberry 450 MG CAPS Take by mouth      Glucosamine 500 MG CAPS Take 500 mg by mouth 2 times daily      Chondroitin Sulfate 400 MG CAPS Take by mouth      naproxen (NAPROSYN) 500 MG tablet Take 500 mg by mouth 2 times daily (with meals)      valACYclovir (VALTREX) 500 MG tablet Take 500 mg by mouth daily      venlafaxine (EFFEXOR XR) 37.5 MG extended release capsule Take 37.5 mg by mouth daily      cyclobenzaprine (FLEXERIL) 10 MG tablet Take 10 mg by mouth daily         FAMILY HISTORY: reviewed and noncontributory, see HPI     Physical Examination:     /72 (Site: Left Upper Arm, Position: Sitting, Cuff Size: Large Adult)   Pulse 73   Resp 18   Ht 5' 5.98\" (1.676 m)   Wt 217 lb (98.4 kg)   SpO2 96%   BMI 35.04 kg/m²  Body mass index is 35.04 kg/m². Constitutional: She is oriented to person, place, and time. She appears well-developed and well-nourished. In no acute distress. HEENT: Normocephalic and atraumatic. No JVD present. Carotid bruit is not present. No mass and no thyromegaly present. No lymphadenopathy present. Cardiovascular: Normal rate, regularly irregular rhythm, normal heart sounds. Exam reveals no gallop and no friction rubs. No murmur was heard. .  Pulmonary/Chest: Effort normal and breath sounds normal. No respiratory distress. She has no wheezes, rhonchi or rales. Abdominal: Soft, non-tender. Bowel sounds and aorta are normal. She exhibits no organomegaly, mass or bruit. Extremities: No edema. No cyanosis or clubbing. 2+ radial and carotid pulses. Distal extremity pulses: 2+ bilaterally. Neurological: She is alert and oriented to person, place, and time. No evidence of gross cranial nerve deficit. Coordination appeared normal.   Skin: Skin is warm and dry. There is no rash or diaphoresis. Psychiatric: She has a normal mood and affect.  Her speech is normal and behavior is normal.        MOST RECENT LABS ON RECORD:     Last 3 CBC:  No results found for: WBC, RBC, HGB, HCT, MCV, MCH, MCHC, RDW, PLT, MPV    Last 3 BMP:  Lab Results   Component Value Date     11/29/2021    K 3.9 11/29/2021     11/29/2021    CO2 21 11/29/2021    BUN 20 11/29/2021    CREATININE 1.10 11/29/2021    CALCIUM 9.5 11/29/2021       Last 3 LIPID:  Lab Results   Component Value Date    CHOL 185 11/29/2021    HDL 48 11/29/2021    CHOLHDLRATIO 3.9 11/29/2021    TRIG 193 11/29/2021    VLDL NOT REPORTED 11/29/2021       Last 3 TROPONIN:  No results found for: TROPONINT     ASSESSMENT:     1. PAF (paroxysmal atrial fibrillation) ([address]    2. Chronic anticoagulation    3. Mixed hyperlipidemia    4. BRAULIO (obstructive sleep apnea)    5. Primary hypertension       PLAN:        · Paroxysmal Atrial Fibrillation:   · I reviewed the results of her CAM monitor with her showing Average  HR during sinus rhythm is 82 bpm, ranging between 53 and 121 bpm. Sinus tachycardiac 155 of the time. atrial fibrillation 2.75, longest episode is 2.4 hours with an average heart rate of 132 bpm. Atrial flutter with variable block 2.25 with an average  bpm.  · I answered all questions she had at this time. · I did discuss three possible options for her atrial fibrillation. We could continue her current medications and see her back in six months to reassess (rate control). She could start her on a rhythm control medication to keep her from going back into atrial fibrillation(rhythm control) or we could proceed with an ablation procedure. After discussion she would like to continue the medication she is on and follow up in six months due to being asymptomatic and not constantly being in atrial fibrillation.  Beta Blocker: Continue Metoprolol tartrate (Lopressor) 25 mg bid.  Calcium Channel Blocker: Continue diltiazem CD (Cardizem CD) 120 mg 2 times daily.  Anti-Arrhythmic: Not indicated.   [id number] Score for Atrial Fibrillation Stroke Risk   Risk   Factors  Component Value   C CHF No 0   H HTN Yes 1   A2 Age >= 76 No,  (68 y.o.) 0   D DM No 0   S2 Prior Stroke/TIA No 0   V Vascular Disease No 0   A Age 74-69 Yes,  (68 y.o.) 1   Sc Sex female 1    [id number]  Score  3   Score last updated 49/14/83 2:32 PM EST  Click here for a link to the UpToDate guideline \"Atrial Fibrillation: Anticoagulation therapy to prevent embolization  Disclaimer: Risk Score calculation is dependent on accuracy of patient problem list and past encounter diagnosis.  Stroke Risk: CHADS2-VASc Score: 3/9 (3.2% stroke risk)   Anticoagulation: Continue Apixaban (Eliquis) 5 mg every 12 hours. · Atherosclerotic Heart Disease: Mildly abnormal stress test    Currently asymptomatic.  Overall low risk stress test based on the report from Jesus Rold[facility].   Antiplatelet Agent: Not indicated at this time. Patient on Eliquis.  Beta Blocker: Continue Metoprolol tartrate (Lopressor) 25 mg bid.  Anti-anginal medications: Continue diltiazem CD (Cardizem CD) 120 mg once daily.  Cholesterol Reduction Therapy: Continue Atorvastatin (Lipitor) 20 mg daily.  Additional counseling: I advised them to call our office or go to the emergency room if they developed worsening or persistent chest pain or increased shortness of breath as this could be life threatening. · Essential Hypertension: Controlled   Beta Blocker: Continue Metoprolol tartrate (Lopressor) 25 mg bid.  ACE Inibitor/ARB: Not indicated at this time.  Calcium Channel Blocker: Continue diltiazem CD (Cardizem CD) 120 mg 2 times daily.  Diuretics: Not indicated at this time. · Hyperlipidemia: Mixed LDL 98 mg/dl on 11/29/2021  · Cholesterol Reduction Therapy: Continue Atorvastatin (Lipitor) 20 mg daily. I discussed the potential benefits of statin therapy as well as the potential risks including myalgia as well as the rare but potentially serious complication of liver or kidney damage. Although rare, I told them that this could be serious and therefore told them to stop the medication immediately and call if they developed any severe muscle aches or pains and they agreed to do so. · I will get a repeat lipid panel follow-up. Obesity: Body mass index is 35.04 kg/m². I also briefly discussed both diet and exercise strategies for her to continue to loses weight and she was very receptive to this. She said she recently started focusing on losing weight.  She is going to start calorie counting.  Obstructive Sleep Apnea:   o Moderate BRAULIO syndrome with AHI of 28  o She is currently wearing her CPAP machine nightly. In the meantime, I encouraged Ms. Brannon to continue to take her other medications. FOLLOW UP:   I told Ms. Brannon to call my office if she had any problems, but otherwise I asked her to Return in about 6 months (around 9/1/2022). However, I would be happy to see her sooner should the need arise. Sincerely,    Leeanne Armstrong MD, F.A.C.C. [facility] Cardiology Specialist    [address] [place] [address], [address] [place] [address]  Phone: [phone], Fax: [phone]     I believe that the risk of significant morbidity and mortality related to the patient's current medical conditions are: Intermediate. Between 30 and 44 minutes were spent during prep work, discussion and exam of the patient, and follow up documentation and all of their questions were answered. The documentation recorded by the scribe, accurately and completely reflects the services I personally performed and the decisions made by me. Leeanne Armstrong MD, F.A.C.C.  March 1, 2022

## 2022-03-01 NOTE — PATIENT INSTRUCTIONS
SURVEY:    You may be receiving a survey from [employer] regarding your visit today. Please complete the survey to enable us to provide the highest quality of care to you and your family. If you cannot score us a very good on any question, please call the office to discuss how we could have made your experience a very good one. Thank you.

## 2022-05-25 RX ORDER — POTASSIUM CHLORIDE 20 MEQ/1
20 TABLET, EXTENDED RELEASE ORAL DAILY
Qty: 90 TABLET | Refills: 3 | Status: SHIPPED | OUTPATIENT
Start: 2022-05-25

## 2022-05-25 RX ORDER — MAGNESIUM OXIDE 400 MG/1
400 TABLET ORAL 2 TIMES DAILY
Qty: 180 TABLET | Refills: 3 | Status: SHIPPED | OUTPATIENT
Start: 2022-05-25

## 2022-05-25 RX ORDER — ATORVASTATIN CALCIUM 20 MG/1
20 TABLET, FILM COATED ORAL DAILY
Qty: 90 TABLET | Refills: 3 | Status: SHIPPED | OUTPATIENT
Start: 2022-05-25

## 2022-05-25 RX ORDER — DILTIAZEM HYDROCHLORIDE 120 MG/1
120 CAPSULE, COATED, EXTENDED RELEASE ORAL 2 TIMES DAILY
Qty: 180 CAPSULE | Refills: 3 | Status: SHIPPED | OUTPATIENT
Start: 2022-05-25

## 2022-05-25 RX ORDER — APIXABAN 5 MG/1
5 TABLET, FILM COATED ORAL 2 TIMES DAILY
Qty: 180 TABLET | Refills: 3 | Status: SHIPPED | OUTPATIENT
Start: 2022-05-25

## 2022-09-01 ENCOUNTER — OFFICE VISIT (OUTPATIENT)
Dept: CARDIOLOGY | Age: 67
End: 2022-09-01
Payer: MEDICARE

## 2022-09-01 VITALS
OXYGEN SATURATION: 98 % | DIASTOLIC BLOOD PRESSURE: 80 MMHG | WEIGHT: 218.6 LBS | BODY MASS INDEX: 35.13 KG/M2 | HEART RATE: 71 BPM | RESPIRATION RATE: 18 BRPM | HEIGHT: 66 IN | SYSTOLIC BLOOD PRESSURE: 115 MMHG

## 2022-09-01 DIAGNOSIS — E66.9 OBESITY, CLASS II, BMI 35-39.9: ICD-10-CM

## 2022-09-01 DIAGNOSIS — Z79.01 CHRONIC ANTICOAGULATION: ICD-10-CM

## 2022-09-01 DIAGNOSIS — I10 PRIMARY HYPERTENSION: ICD-10-CM

## 2022-09-01 DIAGNOSIS — I48.0 PAF (PAROXYSMAL ATRIAL FIBRILLATION) (HCC): Primary | ICD-10-CM

## 2022-09-01 DIAGNOSIS — E78.2 MIXED HYPERLIPIDEMIA: ICD-10-CM

## 2022-09-01 DIAGNOSIS — G47.33 OSA (OBSTRUCTIVE SLEEP APNEA): ICD-10-CM

## 2022-09-01 PROCEDURE — 1090F PRES/ABSN URINE INCON ASSESS: CPT | Performed by: INTERNAL MEDICINE

## 2022-09-01 PROCEDURE — 99211 OFF/OP EST MAY X REQ PHY/QHP: CPT | Performed by: INTERNAL MEDICINE

## 2022-09-01 PROCEDURE — 1123F ACP DISCUSS/DSCN MKR DOCD: CPT | Performed by: INTERNAL MEDICINE

## 2022-09-01 PROCEDURE — G8427 DOCREV CUR MEDS BY ELIG CLIN: HCPCS | Performed by: INTERNAL MEDICINE

## 2022-09-01 PROCEDURE — G8400 PT W/DXA NO RESULTS DOC: HCPCS | Performed by: INTERNAL MEDICINE

## 2022-09-01 PROCEDURE — G8417 CALC BMI ABV UP PARAM F/U: HCPCS | Performed by: INTERNAL MEDICINE

## 2022-09-01 PROCEDURE — 3017F COLORECTAL CA SCREEN DOC REV: CPT | Performed by: INTERNAL MEDICINE

## 2022-09-01 PROCEDURE — 99214 OFFICE O/P EST MOD 30 MIN: CPT | Performed by: INTERNAL MEDICINE

## 2022-09-01 PROCEDURE — 1036F TOBACCO NON-USER: CPT | Performed by: INTERNAL MEDICINE

## 2022-09-01 NOTE — PROGRESS NOTES
Ck Horn am scribing for and in the presence of Savage Farah MD, F.A.C.C..     Patient: Germaine Christian  : 1955  Date of Visit: 2022    REASON FOR VISIT / CONSULTATION: Follow-up (Hx: PAF, HLD, BRAULIO, HTN, Pt is here for 6 month f/u. Concerned about her weight, trying to lose weight.  likes to eat. SOB every now and then Denies: CP, dizziness, lightheaded, palps)      History of Present Illness:        Dear Dhiraj To MD    I had the pleasure of seeing Germaine Christian today. Ms. Lloyd Watson is a 79 y.o. female  with a history of atrial fibrillation 1st diagnosed around 2021. She does not feel when she is in atrial fibrillation, no symptoms. She said she went for colonoscopy back in 2021 and was found to be in atrial fibrillation but converted to sinus rhythm spontaneously. She did have a stress and echo in 2021. I reviewed the results and apparently her stress test was mildly abnormal showing a small distal anterior ischemia. Because she was asymptomatic she is treated medically with Lipitor, Metoprolol, Diltiazem and eliquis. She does have sleep apnea. AHI is 28 consistent with moderate obstructive sleep apnea syndrome. Currently using CPAP. Family cardiac history includes her mother who had CHF in her late 63's, early 66's. Her sister and brother have atrial fibrillation. She is a former smoker, social smoker. She quit about 25 years ago. She smoked maybe a pack every 2 weeks for 20 years or so. She does have a history of syncope many many years ago. No reoccurrence. EKG done in office on 2021: Sinus rhythm with PACs, no acute ischemic changes. CAM monitor on 2021- 11 days recorded. Average  HR during sinus rhythm is 82 bpm, ranging between 53 and 121 bpm. Sinus tachycardiac 155 of the time.  atrial fibrillation 2.75, longest episode is 2.4 hours with an average heart rate of 132 bpm. Atrial flutter with variable block 2.25 with an average  bpm.    Ms. Yasmin Fernandez is here today for a follow up. She has had no emergency room visits, hospital stays or minor office procedures. She is very concerned about her weight, however she is only up 1.5 pounds in the last six months. She has had a lot of fatigue. She works about 5 hours a day and that's it. She did buy a bicycle she hasn't used yet. She did get Covid in . She denies any palpitations. No chest pain. No chest pressure or tightness. No palpitations, dizziness or lightheadedness. No blood in her urine or stool. No problems with her medications. No cough, fever or chills. No abdominal pain, nausea or vomiting. She does sleep as well as she can using her CPAP machine. She does states its hard to go to sleep due to feeling warm but once she falls asleep she is fine. She has had some swelling her ankles. She did state the Eliquis is very expensive. PAST MEDICAL HISTORY:        Past Medical History:   Diagnosis Date    Class 1 obesity due to excess calories with body mass index (BMI) of 33.0 to 33.9 in adult     Fibromyalgia     Fibromyalgia     Former smoker     History of echocardiogram 2021    History of stress test 2021    Hypothyroidism     Mixed hyperlipidemia     BRAULIO (obstructive sleep apnea)     PAF (paroxysmal atrial fibrillation) ([facility] [address]     Primary hypertension        CURRENT ALLERGIES: Patient has no known allergies. REVIEW OF SYSTEMS: 14 systems were reviewed. Pertinent positives and negatives as above, all else negative.      Past Surgical History:   Procedure Laterality Date    COLONOSCOPY      HAND SURGERY      carpel tunnel    ROTATOR CUFF REPAIR      TONSILLECTOMY      TUBAL LIGATION      Social History:  Social History     Tobacco Use    Smoking status: Former     Packs/day: 0.25     Years: 20.00     Pack years: 5.00     Types: Cigarettes     Quit date:      Years since quittin.6    Smokeless tobacco: Never    Tobacco comments:     smoked 1 pack every 2 weeks   Substance Use Topics    Alcohol use: Yes     Comment: social    Drug use: No        CURRENT MEDICATIONS:        Outpatient Medications Marked as Taking for the 9/1/22 encounter (Office Visit) with Alex Mejia MD   Medication Sig Dispense Refill    BIOTIN PO Take by mouth three times daily      magnesium oxide (MAG-OX) 400 MG tablet Take 1 tablet by mouth 2 times daily 180 tablet 3    atorvastatin (LIPITOR) 20 MG tablet Take 1 tablet by mouth daily 90 tablet 3    dilTIAZem (CARDIZEM CD) 120 MG extended release capsule Take 1 capsule by mouth 2 times daily 180 capsule 3    metoprolol tartrate (LOPRESSOR) 25 MG tablet Take 1 tablet by mouth 2 times daily 180 tablet 3    potassium chloride (KLOR-CON M) 20 MEQ extended release tablet Take 1 tablet by mouth daily 90 tablet 3    ELIQUIS 5 MG TABS tablet Take 1 tablet by mouth 2 times daily 180 tablet 3    levothyroxine (SYNTHROID) 75 MCG tablet Take 75 mcg by mouth daily      omeprazole (PRILOSEC) 20 MG delayed release capsule Take 20 mg by mouth daily      loratadine (CLARITIN REDITABS) 10 MG dissolvable tablet Take 10 mg by mouth daily      Multiple Vitamins-Minerals (THERAPEUTIC MULTIVITAMIN-MINERALS) tablet Take 1 tablet by mouth daily      Omega-3 Fatty Acids (FISH OIL) 1200 MG CAPS Take by mouth      vitamin B-12 (CYANOCOBALAMIN) 1000 MCG tablet Take 1,000 mcg by mouth daily      Flaxseed, Linseed, (FLAX SEED OIL) 1000 MG CAPS Take 1,000 mg by mouth daily      Glucosamine 500 MG CAPS Take 500 mg by mouth 2 times daily      Chondroitin Sulfate 400 MG CAPS Take by mouth      valACYclovir (VALTREX) 500 MG tablet Take 500 mg by mouth daily      venlafaxine (EFFEXOR XR) 37.5 MG extended release capsule Take 37.5 mg by mouth daily      cyclobenzaprine (FLEXERIL) 10 MG tablet Take 10 mg by mouth daily         FAMILY HISTORY: reviewed and noncontributory, see HPI     Physical Examination:     /80 (Site: Left Upper Arm, Position: Sitting, Cuff Size: Large Adult)   Pulse 71   Resp 18   Ht 5' 5.98\" (1.676 m)   Wt 218 lb 9.6 oz (99.2 kg)   SpO2 98%   BMI 35.30 kg/m²  Body mass index is 35.3 kg/m². Constitutional: She is oriented to person, place, and time. She appears well-developed and well-nourished. In no acute distress. HEENT: Normocephalic and atraumatic. No JVD present. Carotid bruit is not present. No mass and no thyromegaly present. No lymphadenopathy present. Cardiovascular: Normal rate, regularly irregular rhythm, normal heart sounds. Exam reveals no gallop and no friction rubs. No murmur was heard. Pulmonary/Chest: Effort normal and breath sounds normal. No respiratory distress. She has no wheezes, rhonchi or rales. Abdominal: Soft, non-tender. Bowel sounds and aorta are normal. She exhibits no organomegaly, mass or bruit. Extremities:Trace edema. No cyanosis or clubbing. 2+ radial and carotid pulses. Distal extremity pulses: 2+ bilaterally. Neurological: She is alert and oriented to person, place, and time. No evidence of gross cranial nerve deficit. Coordination appeared normal.   Skin: Skin is warm and dry. There is no rash or diaphoresis. Psychiatric: She has a normal mood and affect.  Her speech is normal and behavior is normal.        MOST RECENT LABS ON RECORD:     Last 3 CBC:  No results found for: WBC, RBC, HGB, HCT, MCV, MCH, MCHC, RDW, PLT, MPV    Last 3 BMP:  Lab Results   Component Value Date/Time     11/29/2021 01:20 PM    K 3.9 11/29/2021 01:20 PM     11/29/2021 01:20 PM    CO2 21 11/29/2021 01:20 PM    BUN 20 11/29/2021 01:20 PM    CREATININE 1.10 11/29/2021 01:20 PM    CALCIUM 9.5 11/29/2021 01:20 PM       Last 3 LIPID:  Lab Results   Component Value Date/Time    CHOL 185 11/29/2021 01:20 PM    HDL 48 11/29/2021 01:20 PM    CHOLHDLRATIO 3.9 11/29/2021 01:20 PM    TRIG 193 11/29/2021 01:20 PM    VLDL NOT REPORTED 11/29/2021 01:20 PM       Last 3 TROPONIN:  No results found for: TROPONINT     ASSESSMENT:     1. PAF (paroxysmal atrial fibrillation) ([address]    2. Chronic anticoagulation    3. BRAULIO (obstructive sleep apnea)    4. Primary hypertension    5. Mixed hyperlipidemia    6. Obesity, Class II, BMI 35-39.9       PLAN:        Paroxysmal Atrial Fibrillation:    CAM monitor on 11/2021 showed Average  HR during sinus rhythm is 82 bpm, ranging between 53 and 121 bpm.. atrial fibrillation 2.7%, longest episode is 2.4 hours with an average heart rate of 132 bpm. Atrial flutter with variable block 2.25 with an average  bpm  Beta Blocker: Continue Metoprolol tartrate (Lopressor) 25 mg bid. Calcium Channel Blocker: Continue diltiazem CD (Cardizem CD) 120 mg 2 times daily. Anti-Arrhythmic: Not indicated. [id number] Score for Atrial Fibrillation Stroke Risk   Risk   Factors  Component Value   C CHF No 0   H HTN Yes 1   A2 Age >= 76 No,  (78 y.o.) 0   D DM No 0   S2 Prior Stroke/TIA No 0   V Vascular Disease No 0   A Age 74-69 Yes,  (78 y.o.) 1   Sc Sex female 1    [id number]  Score  3   Score last updated 55/64/23 1:50 PM EST  Click here for a link to the UpToDate guideline \"Atrial Fibrillation: Anticoagulation therapy to prevent embolization  Disclaimer: Risk Score calculation is dependent on accuracy of patient problem list and past encounter diagnosis. Stroke Risk: CHADS2-VASc Score: 3/9 (3.2% stroke risk)  Anticoagulation: Continue Apixaban (Eliquis) 5 mg every 12 hours. We will call her insurance company and see which medication she qualifies for due to the cost of Eliquis. I did ask to her have her routine blood work sent to our office so I can review. Atherosclerotic Heart Disease: Mildly abnormal stress test   Currently asymptomatic. Overall low risk stress test based on the report from [facility].  Antiplatelet Agent: Not indicated at this time. Patient on Eliquis. Beta Blocker: Continue Metoprolol tartrate (Lopressor) 25 mg bid.    Anti-anginal medications: Continue diltiazem CD (Cardizem CD) 120 mg twice daily. Cholesterol Reduction Therapy: Continue Atorvastatin (Lipitor) 20 mg daily. Additional counseling: I advised them to call our office or go to the emergency room if they developed worsening or persistent chest pain or increased shortness of breath as this could be life threatening. Essential Hypertension: Controlled  Beta Blocker: Continue Metoprolol tartrate (Lopressor) 25 mg bid. ACE Inibitor/ARB: Not indicated at this time. Calcium Channel Blocker: Continue diltiazem CD (Cardizem CD) 120 mg 2 times daily. Diuretics: Not indicated at this time. Hyperlipidemia: Mixed LDL 98 mg/dl on 11/29/2021  Cholesterol Reduction Therapy: Continue Atorvastatin (Lipitor) 20 mg daily. I discussed the potential benefits of statin therapy as well as the potential risks including myalgia as well as the rare but potentially serious complication of liver or kidney damage. Although rare, I told them that this could be serious and therefore told them to stop the medication immediately and call if they developed any severe muscle aches or pains and they agreed to do so. Obesity: Body mass index is 35.3 kg/m². I also briefly discussed both diet and exercise strategies for her to continue to lose weight. Obstructive Sleep Apnea: Moderate BRAULIO syndrome with AHI of 28  She is currently wearing her CPAP machine nightly. In the meantime, I encouraged Ms. Brannon to continue to take her other medications. FOLLOW UP:   I told Ms. Brannon to call my office if she had any problems, but otherwise I asked her to Return in about 6 months (around 3/1/2023). However, I would be happy to see her sooner should the need arise. Sincerely,    Suad Siddiqui MD, F.A.C.C.   [facility]    [address]  [facility], [address] [place] [address]  Phone: [phone], Fax: [phone]     I believe that the risk of significant morbidity and mortality related to the patient's current medical conditions are: Intermediate. Approximately 35 minutes were spent during prep work, discussion and exam of the patient, and follow up documentation and all of their questions were answered. The documentation recorded by the scribe, accurately and completely reflects the services I personally performed and the decisions made by me. Parul Winston MD, F.A.C.C.  September 1, 2022

## 2022-11-10 RX ORDER — APIXABAN 5 MG/1
5 TABLET, FILM COATED ORAL 2 TIMES DAILY
Qty: 180 TABLET | Refills: 3 | Status: SHIPPED | OUTPATIENT
Start: 2022-11-10

## 2022-12-15 RX ORDER — POTASSIUM CHLORIDE 20 MEQ/1
20 TABLET, EXTENDED RELEASE ORAL DAILY
Qty: 90 TABLET | Refills: 3 | Status: SHIPPED | OUTPATIENT
Start: 2022-12-15

## 2022-12-16 RX ORDER — POTASSIUM CHLORIDE 1500 MG/1
TABLET, FILM COATED, EXTENDED RELEASE ORAL
Qty: 90 TABLET | Refills: 3 | Status: SHIPPED | OUTPATIENT
Start: 2022-12-16

## 2023-03-09 ENCOUNTER — OFFICE VISIT (OUTPATIENT)
Dept: CARDIOLOGY | Age: 68
End: 2023-03-09
Payer: MEDICARE

## 2023-03-09 ENCOUNTER — HOSPITAL ENCOUNTER (OUTPATIENT)
Dept: NON INVASIVE DIAGNOSTICS | Age: 68
Discharge: HOME | End: 2023-03-09
Payer: MEDICARE

## 2023-03-09 VITALS
SYSTOLIC BLOOD PRESSURE: 119 MMHG | BODY MASS INDEX: 35.55 KG/M2 | HEART RATE: 98 BPM | RESPIRATION RATE: 18 BRPM | DIASTOLIC BLOOD PRESSURE: 80 MMHG | HEIGHT: 66 IN | WEIGHT: 221.2 LBS | OXYGEN SATURATION: 98 %

## 2023-03-09 DIAGNOSIS — E66.9 OBESITY, CLASS II, BMI 35-39.9: ICD-10-CM

## 2023-03-09 DIAGNOSIS — G47.33 OSA (OBSTRUCTIVE SLEEP APNEA): ICD-10-CM

## 2023-03-09 DIAGNOSIS — I48.0 PAF (PAROXYSMAL ATRIAL FIBRILLATION) (HCC): Primary | ICD-10-CM

## 2023-03-09 DIAGNOSIS — I10 PRIMARY HYPERTENSION: ICD-10-CM

## 2023-03-09 DIAGNOSIS — I25.10 ASHD (ARTERIOSCLEROTIC HEART DISEASE): ICD-10-CM

## 2023-03-09 DIAGNOSIS — I48.0 PAF (PAROXYSMAL ATRIAL FIBRILLATION) (HCC): ICD-10-CM

## 2023-03-09 PROCEDURE — 1123F ACP DISCUSS/DSCN MKR DOCD: CPT | Performed by: INTERNAL MEDICINE

## 2023-03-09 PROCEDURE — 1036F TOBACCO NON-USER: CPT | Performed by: INTERNAL MEDICINE

## 2023-03-09 PROCEDURE — G8427 DOCREV CUR MEDS BY ELIG CLIN: HCPCS | Performed by: INTERNAL MEDICINE

## 2023-03-09 PROCEDURE — G8484 FLU IMMUNIZE NO ADMIN: HCPCS | Performed by: INTERNAL MEDICINE

## 2023-03-09 PROCEDURE — 99211 OFF/OP EST MAY X REQ PHY/QHP: CPT | Performed by: INTERNAL MEDICINE

## 2023-03-09 PROCEDURE — 93242 EXT ECG>48HR<7D RECORDING: CPT

## 2023-03-09 PROCEDURE — 93010 ELECTROCARDIOGRAM REPORT: CPT | Performed by: INTERNAL MEDICINE

## 2023-03-09 PROCEDURE — 1090F PRES/ABSN URINE INCON ASSESS: CPT | Performed by: INTERNAL MEDICINE

## 2023-03-09 PROCEDURE — 99214 OFFICE O/P EST MOD 30 MIN: CPT | Performed by: INTERNAL MEDICINE

## 2023-03-09 PROCEDURE — G8400 PT W/DXA NO RESULTS DOC: HCPCS | Performed by: INTERNAL MEDICINE

## 2023-03-09 PROCEDURE — 3074F SYST BP LT 130 MM HG: CPT | Performed by: INTERNAL MEDICINE

## 2023-03-09 PROCEDURE — G8417 CALC BMI ABV UP PARAM F/U: HCPCS | Performed by: INTERNAL MEDICINE

## 2023-03-09 PROCEDURE — 3017F COLORECTAL CA SCREEN DOC REV: CPT | Performed by: INTERNAL MEDICINE

## 2023-03-09 PROCEDURE — 93243 EXT ECG>48HR<7D SCAN A/R: CPT

## 2023-03-09 PROCEDURE — 93005 ELECTROCARDIOGRAM TRACING: CPT | Performed by: INTERNAL MEDICINE

## 2023-03-09 PROCEDURE — 3078F DIAST BP <80 MM HG: CPT | Performed by: INTERNAL MEDICINE

## 2023-03-09 NOTE — PROGRESS NOTES
Mehran Schneider am scribing for and in the presence of Rosie Santana MD, F.A.C.C..     Patient: Paty Chapa  : 1955  Date of Visit: 2023    REASON FOR VISIT / CONSULTATION: Follow-up (Hx: PAF, HLD, BRAULIO, HTN. She has been alright. Some SOB at times, not worsening. //Denies: CP, Lightheaded/dizziness, Palpitations. )      History of Present Illness:        Dear Pedro Bardales MD    I had the pleasure of seeing Paty Chapa today. Ms. Candace Padilla is a 79 y.o. female  with a history of atrial fibrillation 1st diagnosed around 2021. She does not feel when she is in atrial fibrillation, no symptoms. She said she went for colonoscopy back in 2021 and was found to be in atrial fibrillation but converted to sinus rhythm spontaneously. She did have a stress and echo in 2021. I reviewed the results and apparently her stress test was mildly abnormal showing a small distal anterior ischemia. Because she was asymptomatic she is treated medically with Lipitor, Metoprolol, Diltiazem and eliquis. She does have sleep apnea. AHI is 28 consistent with moderate obstructive sleep apnea syndrome. Currently using CPAP. Family cardiac history includes her mother who had CHF in her late 63's, early 66's. Her sister and brother have atrial fibrillation. She is a former smoker, social smoker. She quit about 25 years ago. She smoked maybe a pack every 2 weeks for 20 years or so. She does have a history of syncope many many years ago. No reoccurrence. EKG done in office on 2021: Sinus rhythm with PACs, no acute ischemic changes. CAM monitor on 2021- 11 days recorded. Average  HR during sinus rhythm is 82 bpm, ranging between 53 and 121 bpm. Sinus tachycardiac 155 of the time.  atrial fibrillation 2.75, longest episode is 2.4 hours with an average heart rate of 132 bpm. Atrial flutter with variable block 2.25 with an average  bpm.    Ms. Maria Friend is here today for a follow up. She is doing well overall. She does not feel any palpitations although she is in atrial fibrillation with controlled ventricular response in the office today. No chest pain, pressure or tightness. She has chronic fatigue. She is compliant with CPAP therapy. She has chronic, stable shortness of breath on exertion that did not get any worse over the past several months. She is able to take care of her activities of daily living without significant exertional symptoms but she does not do any routine walking or exercise. No bleeding complications from her Eliquis. PAST MEDICAL HISTORY:        Past Medical History:   Diagnosis Date    Class 1 obesity due to excess calories with body mass index (BMI) of 33.0 to 33.9 in adult     Fibromyalgia     Fibromyalgia     Former smoker     History of echocardiogram 2021    History of stress test 2021    Hypothyroidism     Mixed hyperlipidemia     BRAULIO (obstructive sleep apnea)     PAF (paroxysmal atrial fibrillation) ([facility] [address]     Primary hypertension        CURRENT ALLERGIES: Patient has no known allergies. REVIEW OF SYSTEMS: 14 systems were reviewed. Pertinent positives and negatives as above, all else negative.      Past Surgical History:   Procedure Laterality Date    COLONOSCOPY      HAND SURGERY      carpel tunnel    ROTATOR CUFF REPAIR      TONSILLECTOMY      TUBAL LIGATION      Social History:  Social History     Tobacco Use    Smoking status: Former     Packs/day: 0.25     Years: 20.00     Pack years: 5.00     Types: Cigarettes     Quit date:      Years since quittin.2    Smokeless tobacco: Never    Tobacco comments:     smoked 1 pack every 2 weeks   Substance Use Topics    Alcohol use: Yes     Comment: social    Drug use: No        CURRENT MEDICATIONS:        Outpatient Medications Marked as Taking for the 3/9/23 encounter (Office Visit) with Peace Monk MD   Medication Sig Dispense Refill    potassium chloride (KLOR-CON M) 20 MEQ extended release tablet Take 1 tablet by mouth daily 90 tablet 3    ELIQUIS 5 MG TABS tablet Take 1 tablet by mouth 2 times daily 180 tablet 3    BIOTIN PO Take by mouth three times daily      magnesium oxide (MAG-OX) 400 MG tablet Take 1 tablet by mouth 2 times daily 180 tablet 3    atorvastatin (LIPITOR) 20 MG tablet Take 1 tablet by mouth daily 90 tablet 3    dilTIAZem (CARDIZEM CD) 120 MG extended release capsule Take 1 capsule by mouth 2 times daily 180 capsule 3    metoprolol tartrate (LOPRESSOR) 25 MG tablet Take 1 tablet by mouth 2 times daily 180 tablet 3    levothyroxine (SYNTHROID) 75 MCG tablet Take 75 mcg by mouth daily      omeprazole (PRILOSEC) 20 MG delayed release capsule Take 20 mg by mouth daily      loratadine (CLARITIN REDITABS) 10 MG dissolvable tablet Take 10 mg by mouth daily      Multiple Vitamins-Minerals (THERAPEUTIC MULTIVITAMIN-MINERALS) tablet Take 1 tablet by mouth daily      Omega-3 Fatty Acids (FISH OIL) 1200 MG CAPS Take by mouth      vitamin B-12 (CYANOCOBALAMIN) 1000 MCG tablet Take 1,000 mcg by mouth daily      Flaxseed, Linseed, (FLAX SEED OIL) 1000 MG CAPS Take 1,000 mg by mouth daily      Glucosamine 500 MG CAPS Take 500 mg by mouth 2 times daily      Chondroitin Sulfate 400 MG CAPS Take by mouth      naproxen (NAPROSYN) 500 MG tablet Take 500 mg by mouth 2 times daily (with meals)      valACYclovir (VALTREX) 500 MG tablet Take 500 mg by mouth daily      venlafaxine (EFFEXOR XR) 37.5 MG extended release capsule Take 37.5 mg by mouth daily      cyclobenzaprine (FLEXERIL) 10 MG tablet Take 10 mg by mouth daily         FAMILY HISTORY: reviewed and noncontributory, see HPI     Physical Examination:     /80 (Site: Left Upper Arm, Position: Sitting, Cuff Size: Medium Adult)   Pulse 98   Resp 18   Ht 5' 6\" (1.676 m)   Wt 221 lb 3.2 oz (100.3 kg)   SpO2 98%   BMI 35.70 kg/m²  Body mass index is 35.7 kg/m².     Constitutional: She is oriented to person, place, and time. She appears well-developed and well-nourished. In no acute distress. HEENT: Normocephalic and atraumatic. No JVD present. Carotid bruit is not present. No mass and no thyromegaly present. No lymphadenopathy present. Cardiovascular: Normal rate, regularly irregular rhythm, normal heart sounds. Exam reveals no gallop and no friction rubs. No murmur was heard. Pulmonary/Chest: Effort normal and breath sounds normal. No respiratory distress. She has no wheezes, rhonchi or rales. Abdominal: Soft, non-tender. Bowel sounds and aorta are normal. She exhibits no organomegaly, mass or bruit. Extremities:Trace edema. No cyanosis or clubbing. 2+ radial and carotid pulses. Distal extremity pulses: 2+ bilaterally. Neurological: She is alert and oriented to person, place, and time. No evidence of gross cranial nerve deficit. Coordination appeared normal.   Skin: Skin is warm and dry. There is no rash or diaphoresis. Psychiatric: She has a normal mood and affect. Her speech is normal and behavior is normal.        MOST RECENT LABS ON RECORD:     Last 3 CBC:  No results found for: WBC, RBC, HGB, HCT, MCV, MCH, MCHC, RDW, PLT, MPV    Last 3 BMP:  Lab Results   Component Value Date/Time     11/29/2021 01:20 PM    K 3.9 11/29/2021 01:20 PM     11/29/2021 01:20 PM    CO2 21 11/29/2021 01:20 PM    BUN 20 11/29/2021 01:20 PM    CREATININE 1.10 11/29/2021 01:20 PM    CALCIUM 9.5 11/29/2021 01:20 PM       Last 3 LIPID:  Lab Results   Component Value Date/Time    CHOL 185 11/29/2021 01:20 PM    HDL 48 11/29/2021 01:20 PM    CHOLHDLRATIO 3.9 11/29/2021 01:20 PM    TRIG 193 11/29/2021 01:20 PM    VLDL NOT REPORTED 11/29/2021 01:20 PM       Last 3 TROPONIN:  No results found for: TROPONINT     ASSESSMENT:     1. PAF (paroxysmal atrial fibrillation) ([facility] [address]    2. ASHD (arteriosclerotic heart disease)    3. Primary hypertension    4.  Obesity, Class II, BMI 35-39.9    5. BRAULIO (obstructive sleep apnea)       PLAN: Paroxysmal Atrial Fibrillation: Currently in atrial fibrillation with controlled ventricular response. Paroxysmal atrial fibrillation on CAM previously as above. Complaining of chronic fatigue and shortness of breath that did not change since last visit. Compliant with CPAP therapy. I did discuss with her rate control versus rhythm control. She is interested in rhythm control strategy at this point. We discussed cardioversion, cardioversion plus antiarrhythmic therapy or antiarrhythmic therapy alone versus proceeding with ablation procedure. Giving her minimal symptoms I told her the best rhythm control strategy is to get her an ablation procedure. I do not think antiarrhythmic therapy will be a good option for her at this point. Patient is highly interested in ablation because she wants to get off rate control agents and possibly getting off of the Eliquis. Beta Blocker: Continue Metoprolol tartrate (Lopressor) 25 mg bid. Calcium Channel Blocker: Continue diltiazem CD (Cardizem CD) 120 mg 2 times daily. Anti-Arrhythmic: Not indicated. [id number] Score for Atrial Fibrillation Stroke Risk   Risk   Factors  Component Value   C CHF No 0   H HTN Yes 1   A2 Age >= 76 No,  (78 y.o.) 0   D DM No 0   S2 Prior Stroke/TIA No 0   V Vascular Disease No 0   A Age 74-69 Yes,  (78 y.o.) 1   Sc Sex female 1    [id number]  Score  3   Score last updated 97/13/34 2:28 PM EST  Click here for a link to the UpToDate guideline \"Atrial Fibrillation: Anticoagulation therapy to prevent embolization  Disclaimer: Risk Score calculation is dependent on accuracy of patient problem list and past encounter diagnosis. Stroke Risk: CHADS2-VASc Score: 3/9 (3.2% stroke risk)  Anticoagulation: Continue Apixaban (Eliquis) 5 mg every 12 hours. I ordered CAM monitor for one week. I will refer her to see EP at [facility] for consultation.      Atherosclerotic Heart Disease: Mildly abnormal stress test   Currently asymptomatic. Overall low risk stress test based on the report from [facility] .  Antiplatelet Agent: Not indicated at this time. Patient on Eliquis. Beta Blocker: Continue Metoprolol tartrate (Lopressor) 25 mg bid. Anti-anginal medications: Continue diltiazem CD (Cardizem CD) 120 mg twice daily. Cholesterol Reduction Therapy: Continue Atorvastatin (Lipitor) 20 mg daily. Additional counseling: I advised them to call our office or go to the emergency room if they developed worsening or persistent chest pain or increased shortness of breath as this could be life threatening. Essential Hypertension: Controlled  Beta Blocker: Continue Metoprolol tartrate (Lopressor) 25 mg bid. ACE Inibitor/ARB: Not indicated at this time. Calcium Channel Blocker: Continue diltiazem CD (Cardizem CD) 120 mg 2 times daily. Diuretics: Not indicated at this time. Hyperlipidemia: Mixed LDL 98 mg/dl on 11/29/2021  Cholesterol Reduction Therapy: Continue Atorvastatin (Lipitor) 20 mg daily. I discussed the potential benefits of statin therapy as well as the potential risks including myalgia as well as the rare but potentially serious complication of liver or kidney damage. Although rare, I told them that this could be serious and therefore told them to stop the medication immediately and call if they developed any severe muscle aches or pains and they agreed to do so. Obesity: Body mass index is 35.7 kg/m². I also briefly discussed both diet and exercise strategies for her to continue to lose weight. Obstructive Sleep Apnea: Moderate BRAULIO syndrome with AHI of 28  She is currently wearing her CPAP machine nightly. In the meantime, I encouraged Ms. Brannon to continue to take her other medications. FOLLOW UP:   I told Ms. Brannon to call my office if she had any problems, but otherwise I asked her to Return in about 6 months (around 9/9/2023).  However, I would be happy to see her sooner should the need arise. Sincerely,  Fede Avilez MD, F.A.C.C. [facility] Cardiology Specialist    [address]  [facility], [address] [address]  Phone: [phone], Fax: [phone]     I believe that the risk of significant morbidity and mortality related to the patient's current medical conditions are: intermediate-high. Approximately 40 minutes were spent during prep work, discussion and exam of the patient, and follow up documentation and all of their questions were answered. The documentation recorded by the scribe, accurately and completely reflects the services I personally performed and the decisions made by me. Fede Avilez MD, F.A.C.C.  March 9, 2023

## 2023-03-09 NOTE — PATIENT INSTRUCTIONS
SURVEY:    You may be receiving a survey from [employer] regarding your visit today. Please complete the survey to enable us to provide the highest quality of care to you and your family. If you cannot score us a very good on any question, please call the office to discuss how we could have made your experience a very good one. Thank you.

## 2023-03-30 RX ORDER — POTASSIUM CHLORIDE 20 MEQ/1
20 TABLET, EXTENDED RELEASE ORAL DAILY
Qty: 90 TABLET | Refills: 3 | Status: SHIPPED | OUTPATIENT
Start: 2023-03-30

## 2023-04-19 DIAGNOSIS — I48.0 PAF (PAROXYSMAL ATRIAL FIBRILLATION) (HCC): Primary | ICD-10-CM

## 2023-04-24 ENCOUNTER — TELEPHONE (OUTPATIENT)
Dept: CARDIOLOGY | Age: 68
End: 2023-04-24

## 2023-04-24 NOTE — TELEPHONE ENCOUNTER
----- Message from Conor Ryder MD sent at 4/22/2023  5:31 PM EDT -----  Please let me see him in 1 to 2 weeks for reevaluation and to go over the results of her heart monitor.   Thank you

## 2023-04-26 ENCOUNTER — TELEPHONE (OUTPATIENT)
Dept: CARDIOLOGY | Age: 68
End: 2023-04-26

## 2023-04-26 DIAGNOSIS — I48.0 PAF (PAROXYSMAL ATRIAL FIBRILLATION) (HCC): Primary | ICD-10-CM

## 2023-04-27 ENCOUNTER — HOSPITAL ENCOUNTER (OUTPATIENT)
Dept: CARDIAC CATH/INVASIVE PROCEDURES | Age: 68
Discharge: HOME | End: 2023-04-27
Attending: INTERNAL MEDICINE | Admitting: INTERNAL MEDICINE
Payer: MEDICARE

## 2023-04-27 VITALS
TEMPERATURE: 96.8 F | HEART RATE: 70 BPM | OXYGEN SATURATION: 98 % | DIASTOLIC BLOOD PRESSURE: 78 MMHG | SYSTOLIC BLOOD PRESSURE: 144 MMHG | RESPIRATION RATE: 20 BRPM

## 2023-04-27 LAB
EKG ATRIAL RATE: 70 BPM
EKG P AXIS: 49 DEGREES
EKG P-R INTERVAL: 192 MS
EKG Q-T INTERVAL: 440 MS
EKG QRS DURATION: 98 MS
EKG QTC CALCULATION (BAZETT): 475 MS
EKG R AXIS: 52 DEGREES
EKG T AXIS: 53 DEGREES
EKG VENTRICULAR RATE: 70 BPM

## 2023-04-27 PROCEDURE — 93005 ELECTROCARDIOGRAM TRACING: CPT

## 2023-05-11 RX ORDER — ATORVASTATIN CALCIUM 20 MG/1
20 TABLET, FILM COATED ORAL DAILY
Qty: 90 TABLET | Refills: 3 | Status: SHIPPED | OUTPATIENT
Start: 2023-05-11

## 2023-06-08 RX ORDER — DILTIAZEM HYDROCHLORIDE 120 MG/1
CAPSULE, COATED, EXTENDED RELEASE ORAL
Qty: 180 CAPSULE | Refills: 3 | Status: SHIPPED | OUTPATIENT
Start: 2023-06-08

## 2023-06-08 RX ORDER — LANOLIN ALCOHOL/MO/W.PET/CERES
CREAM (GRAM) TOPICAL
Qty: 180 TABLET | Refills: 3 | Status: SHIPPED | OUTPATIENT
Start: 2023-06-08

## 2023-07-10 ENCOUNTER — TELEPHONE (OUTPATIENT)
Dept: CARDIOLOGY | Age: 68
End: 2023-07-10

## 2023-07-10 NOTE — TELEPHONE ENCOUNTER
----- Message from Randi eHster MD sent at 7/9/2023 10:18 AM EDT -----  Heart monitor still showing paroxysmal atrial fibrillation. Please let me see her in 1-2 weeks for re-evaluation.  Thank you

## 2023-07-27 ENCOUNTER — OFFICE VISIT (OUTPATIENT)
Dept: CARDIOLOGY | Age: 68
End: 2023-07-27

## 2023-07-27 VITALS
RESPIRATION RATE: 18 BRPM | WEIGHT: 217 LBS | OXYGEN SATURATION: 96 % | DIASTOLIC BLOOD PRESSURE: 72 MMHG | HEART RATE: 74 BPM | SYSTOLIC BLOOD PRESSURE: 111 MMHG | BODY MASS INDEX: 34.87 KG/M2 | HEIGHT: 66 IN

## 2023-07-27 DIAGNOSIS — E78.2 MIXED HYPERLIPIDEMIA: ICD-10-CM

## 2023-07-27 DIAGNOSIS — Z98.890 S/P ABLATION OF ATRIAL FIBRILLATION: ICD-10-CM

## 2023-07-27 DIAGNOSIS — Z79.01 CHRONIC ANTICOAGULATION: ICD-10-CM

## 2023-07-27 DIAGNOSIS — Z86.79 S/P ABLATION OF ATRIAL FIBRILLATION: ICD-10-CM

## 2023-07-27 DIAGNOSIS — G47.33 OSA (OBSTRUCTIVE SLEEP APNEA): ICD-10-CM

## 2023-07-27 DIAGNOSIS — I10 PRIMARY HYPERTENSION: ICD-10-CM

## 2023-07-27 DIAGNOSIS — E66.9 OBESITY, CLASS II, BMI 35-39.9: ICD-10-CM

## 2023-07-27 DIAGNOSIS — I48.0 PAF (PAROXYSMAL ATRIAL FIBRILLATION) (HCC): Primary | ICD-10-CM

## 2023-07-27 DIAGNOSIS — I25.10 ASHD (ARTERIOSCLEROTIC HEART DISEASE): ICD-10-CM

## 2023-07-27 RX ORDER — AMIODARONE HYDROCHLORIDE 200 MG/1
200 TABLET ORAL DAILY
Qty: 90 TABLET | Refills: 3 | Status: SHIPPED | OUTPATIENT
Start: 2023-07-27

## 2023-07-27 NOTE — PATIENT INSTRUCTIONS
SURVEY:    You may be receiving a survey from [employer] regarding your visit today. Please complete the survey to enable us to provide the highest quality of care to you and your family. If you cannot score us a very good on any question, please call the office to discuss how we could have made your experience a very good one. Thank you.

## 2023-11-01 ENCOUNTER — HOSPITAL ENCOUNTER
Dept: HOSPITAL 101 - LAB | Age: 68
Discharge: HOME | End: 2023-11-01
Payer: MEDICARE

## 2023-11-01 DIAGNOSIS — Z00.00: Primary | ICD-10-CM

## 2023-11-01 DIAGNOSIS — E03.9: ICD-10-CM

## 2023-11-01 DIAGNOSIS — E78.5: ICD-10-CM

## 2023-11-01 DIAGNOSIS — R53.83: ICD-10-CM

## 2023-11-01 LAB
ADD MANUAL DIFF: NO
ALANINE AMINOTRANSFERASE: 31 U/L (ref 14–59)
ALBUMIN GLOBULIN RATIO: 1
ALBUMIN LEVEL: 3.6 G/DL (ref 3.4–5)
ALKALINE PHOSPHATASE: 88 U/L (ref 46–116)
ANION GAP: 10.2
ASPARTATE AMINO TRANSFERASE: 25 U/L (ref 15–37)
BLOOD UREA NITROGEN: 17 MG/DL (ref 7–18)
CALCIUM: 8.8 MG/DL (ref 8.5–10.1)
CARBON DIOXIDE: 29.8 MMOL/L (ref 21–32)
CHLORIDE: 103 MMOL/L (ref 98–107)
CHOLESTEROL: 163 MG/DL (ref ?–200)
CO2 BLD-SCNC: 29.8 MMOL/L (ref 21–32)
ESTIMATED GFR (AFRICAN AMERICA: 59 (ref 60–?)
ESTIMATED GFR (NON-AFRICAN AME: 48 (ref 60–?)
GLOBULIN: 3.7 G/DL
GLUCOSE BLD-MCNC: 103 MG/DL (ref 74–106)
HCT VFR BLD CALC: 39.1 % (ref 36–48)
HDL CHOLESTEROL: 61 MG/DL (ref 40–60)
HEMATOCRIT: 39.1 % (ref 36–48)
HEMOGLOBIN: 12.8 G/DL (ref 12–16)
IMMATURE GRANULOCYTES ABS AUTO: 0.02 10^3/UL (ref 0–0.03)
IMMATURE GRANULOCYTES PCT AUTO: 0.3 % (ref 0–0.5)
LYMPHOCYTES  ABSOLUTE AUTO: 2 10^3/UL (ref 1.2–3.8)
MCV RBC: 97 FL (ref 81–99)
MEAN CORPUSCULAR HEMOGLOBIN: 31.8 PG (ref 26.7–34)
MEAN CORPUSCULAR HGB CONC: 32.7 G/DL (ref 29.9–35.2)
MEAN CORPUSCULAR VOLUME: 97 FL (ref 81–99)
PLATELET # BLD: 292 10^3/UL (ref 150–450)
PLATELET COUNT: 292 10^3/UL (ref 150–450)
POTASSIUM SERPLBLD-SCNC: 4 MMOL/L (ref 3.5–5.1)
POTASSIUM: 4 MMOL/L (ref 3.5–5.1)
RED BLOOD COUNT: 4.03 10^6/UL (ref 4.2–5.4)
SODIUM BLD-SCNC: 139 MMOL/L (ref 136–145)
SODIUM: 139 MMOL/L (ref 136–145)
THYROID STIMULATING HORMONE: 3.52 UIU/ML (ref 0.36–3.74)
TOTAL PROTEIN: 7.3 G/DL (ref 6.4–8.2)
TRIGLYCERIDES: 162 MG/DL (ref ?–150)
VLDL CHOLESTEROL: 32.4 MG/DL
WBC # BLD: 5.7 10^3/UL (ref 4–11)
WHITE BLOOD COUNT: 5.7 10^3/UL (ref 4–11)

## 2023-11-01 PROCEDURE — 84439 ASSAY OF FREE THYROXINE: CPT

## 2023-11-01 PROCEDURE — 84443 ASSAY THYROID STIM HORMONE: CPT

## 2023-11-01 PROCEDURE — 83036 HEMOGLOBIN GLYCOSYLATED A1C: CPT

## 2023-11-01 PROCEDURE — 80053 COMPREHEN METABOLIC PANEL: CPT

## 2023-11-01 PROCEDURE — 36415 COLL VENOUS BLD VENIPUNCTURE: CPT

## 2023-11-01 PROCEDURE — 80061 LIPID PANEL: CPT

## 2023-11-01 PROCEDURE — 85025 COMPLETE CBC W/AUTO DIFF WBC: CPT

## 2023-12-05 RX ORDER — APIXABAN 5 MG/1
5 TABLET, FILM COATED ORAL 2 TIMES DAILY
Qty: 180 TABLET | Refills: 3 | Status: SHIPPED | OUTPATIENT
Start: 2023-12-05

## 2024-04-01 ENCOUNTER — APPOINTMENT (OUTPATIENT)
Dept: CT IMAGING | Age: 69
End: 2024-04-01
Payer: MEDICARE

## 2024-04-01 ENCOUNTER — HOSPITAL ENCOUNTER (EMERGENCY)
Age: 69
Discharge: HOME | End: 2024-04-01
Payer: MEDICARE

## 2024-04-01 VITALS
OXYGEN SATURATION: 94 % | DIASTOLIC BLOOD PRESSURE: 64 MMHG | WEIGHT: 230 LBS | RESPIRATION RATE: 16 BRPM | BODY MASS INDEX: 37.12 KG/M2 | HEART RATE: 74 BPM | TEMPERATURE: 97.5 F | SYSTOLIC BLOOD PRESSURE: 146 MMHG

## 2024-04-01 DIAGNOSIS — M51.36 DDD (DEGENERATIVE DISC DISEASE), LUMBAR: Primary | ICD-10-CM

## 2024-04-01 DIAGNOSIS — M54.32 SCIATICA OF LEFT SIDE: ICD-10-CM

## 2024-04-01 DIAGNOSIS — R10.9 FLANK PAIN: ICD-10-CM

## 2024-04-01 LAB
ALBUMIN SERPL-MCNC: 4.3 G/DL (ref 3.5–5.2)
ALBUMIN/GLOB SERPL: 1.3 {RATIO} (ref 1–2.5)
ALP SERPL-CCNC: 89 U/L (ref 35–104)
ALT SERPL-CCNC: 34 U/L (ref 5–33)
ANION GAP SERPL CALCULATED.3IONS-SCNC: 11 MMOL/L (ref 9–17)
AST SERPL-CCNC: 34 U/L
BACTERIA URNS QL MICRO: ABNORMAL
BASOPHILS # BLD: 0.06 K/UL (ref 0–0.2)
BASOPHILS NFR BLD: 1 % (ref 0–2)
BILIRUB SERPL-MCNC: 0.4 MG/DL (ref 0.3–1.2)
BILIRUB UR QL STRIP: NEGATIVE
BUN SERPL-MCNC: 11 MG/DL (ref 8–23)
BUN/CREAT SERPL: 12 (ref 9–20)
CALCIUM SERPL-MCNC: 9.1 MG/DL (ref 8.6–10.4)
CHLORIDE SERPL-SCNC: 102 MMOL/L (ref 98–107)
CLARITY UR: CLEAR
CO2 SERPL-SCNC: 25 MMOL/L (ref 20–31)
COLOR UR: YELLOW
CREAT SERPL-MCNC: 0.9 MG/DL (ref 0.5–0.9)
EOSINOPHIL # BLD: 0.2 K/UL (ref 0–0.44)
EOSINOPHILS RELATIVE PERCENT: 3 % (ref 1–4)
EPI CELLS #/AREA URNS HPF: ABNORMAL /HPF (ref 0–25)
ERYTHROCYTE [DISTWIDTH] IN BLOOD BY AUTOMATED COUNT: 12.2 % (ref 11.8–14.4)
GFR SERPL CREATININE-BSD FRML MDRD: 70 ML/MIN/1.73M2
GLUCOSE SERPL-MCNC: 99 MG/DL (ref 70–99)
GLUCOSE UR STRIP-MCNC: NEGATIVE MG/DL
HCT VFR BLD AUTO: 42.8 % (ref 36.3–47.1)
HGB BLD-MCNC: 14.7 G/DL (ref 11.9–15.1)
HGB UR QL STRIP.AUTO: ABNORMAL
IMM GRANULOCYTES # BLD AUTO: <0.03 K/UL (ref 0–0.3)
IMM GRANULOCYTES NFR BLD: 0 %
KETONES UR STRIP-MCNC: NEGATIVE MG/DL
LEUKOCYTE ESTERASE UR QL STRIP: ABNORMAL
LYMPHOCYTES NFR BLD: 2.62 K/UL (ref 1.1–3.7)
LYMPHOCYTES RELATIVE PERCENT: 41 % (ref 24–43)
MCH RBC QN AUTO: 32.5 PG (ref 25.2–33.5)
MCHC RBC AUTO-ENTMCNC: 34.3 G/DL (ref 28.4–34.8)
MCV RBC AUTO: 94.7 FL (ref 82.6–102.9)
MONOCYTES NFR BLD: 0.45 K/UL (ref 0.1–1.2)
MONOCYTES NFR BLD: 7 % (ref 3–12)
NEUTROPHILS NFR BLD: 48 % (ref 36–65)
NEUTS SEG NFR BLD: 2.98 K/UL (ref 1.5–8.1)
NITRITE UR QL STRIP: NEGATIVE
NRBC BLD-RTO: 0 PER 100 WBC
PH UR STRIP: 7 [PH] (ref 5–9)
PLATELET # BLD AUTO: 338 K/UL (ref 138–453)
PMV BLD AUTO: 10.2 FL (ref 8.1–13.5)
POTASSIUM SERPL-SCNC: 4.5 MMOL/L (ref 3.7–5.3)
PROT SERPL-MCNC: 7.7 G/DL (ref 6.4–8.3)
PROT UR STRIP-MCNC: NEGATIVE MG/DL
RBC # BLD AUTO: 4.52 M/UL (ref 3.95–5.11)
RBC #/AREA URNS HPF: ABNORMAL /HPF (ref 0–2)
SODIUM SERPL-SCNC: 138 MMOL/L (ref 135–144)
SP GR UR STRIP: 1.01 (ref 1.01–1.02)
UROBILINOGEN UR STRIP-ACNC: NORMAL EU/DL (ref 0–1)
WBC #/AREA URNS HPF: ABNORMAL /HPF (ref 0–5)
WBC OTHER # BLD: 6.3 K/UL (ref 3.5–11.3)

## 2024-04-01 PROCEDURE — 6370000000 HC RX 637 (ALT 250 FOR IP): Performed by: NURSE PRACTITIONER

## 2024-04-01 PROCEDURE — 99284 EMERGENCY DEPT VISIT MOD MDM: CPT

## 2024-04-01 PROCEDURE — 85025 COMPLETE CBC W/AUTO DIFF WBC: CPT

## 2024-04-01 PROCEDURE — 2580000003 HC RX 258: Performed by: NURSE PRACTITIONER

## 2024-04-01 PROCEDURE — 81001 URINALYSIS AUTO W/SCOPE: CPT

## 2024-04-01 PROCEDURE — 80053 COMPREHEN METABOLIC PANEL: CPT

## 2024-04-01 PROCEDURE — 74176 CT ABD & PELVIS W/O CONTRAST: CPT

## 2024-04-01 RX ORDER — HYDROCODONE BITARTRATE AND ACETAMINOPHEN 5; 325 MG/1; MG/1
1 TABLET ORAL ONCE
Status: COMPLETED | OUTPATIENT
Start: 2024-04-01 | End: 2024-04-01

## 2024-04-01 RX ORDER — 0.9 % SODIUM CHLORIDE 0.9 %
1000 INTRAVENOUS SOLUTION INTRAVENOUS ONCE
Status: COMPLETED | OUTPATIENT
Start: 2024-04-01 | End: 2024-04-01

## 2024-04-01 RX ADMIN — SODIUM CHLORIDE 1000 ML: 9 INJECTION, SOLUTION INTRAVENOUS at 18:21

## 2024-04-01 RX ADMIN — HYDROCODONE BITARTRATE AND ACETAMINOPHEN 1 TABLET: 5; 325 TABLET ORAL at 18:19

## 2024-04-01 ASSESSMENT — LIFESTYLE VARIABLES
HOW MANY STANDARD DRINKS CONTAINING ALCOHOL DO YOU HAVE ON A TYPICAL DAY: 1 OR 2
HOW OFTEN DO YOU HAVE A DRINK CONTAINING ALCOHOL: MONTHLY OR LESS

## 2024-04-01 ASSESSMENT — PAIN - FUNCTIONAL ASSESSMENT: PAIN_FUNCTIONAL_ASSESSMENT: 0-10

## 2024-04-01 ASSESSMENT — PAIN SCALES - GENERAL
PAINLEVEL_OUTOF10: 9
PAINLEVEL_OUTOF10: 8

## 2024-04-01 ASSESSMENT — PAIN DESCRIPTION - LOCATION
LOCATION: FLANK
LOCATION: FLANK

## 2024-04-01 ASSESSMENT — PAIN DESCRIPTION - PAIN TYPE: TYPE: ACUTE PAIN

## 2024-04-01 ASSESSMENT — PAIN DESCRIPTION - ORIENTATION
ORIENTATION: LEFT
ORIENTATION: LEFT

## 2024-04-01 NOTE — ED PROVIDER NOTES
Mouth: Mucous membranes are moist.      Pharynx: Oropharynx is clear. No oropharyngeal exudate or posterior oropharyngeal erythema.      Comments: Airway patent.  Able to handle secretions  Eyes:      General: No scleral icterus.        Right eye: No discharge.         Left eye: No discharge.      Conjunctiva/sclera: Conjunctivae normal.   Cardiovascular:      Rate and Rhythm: Normal rate and regular rhythm.      Pulses: Normal pulses.      Heart sounds: Normal heart sounds. No murmur heard.     No gallop.      Comments: S1-S2 no murmur  Pulmonary:      Effort: Pulmonary effort is normal. No respiratory distress.      Breath sounds: Normal breath sounds. No stridor. No wheezing, rhonchi or rales.      Comments: Symmetrical chest wall expansion  Chest:      Chest wall: No tenderness.   Abdominal:      General: Bowel sounds are normal.      Palpations: Abdomen is soft.      Tenderness: There is no abdominal tenderness. There is no right CVA tenderness, left CVA tenderness, guarding or rebound.      Comments: Left flank pain.  No CVA tenderness.   Musculoskeletal:         General: No tenderness or signs of injury. Normal range of motion.      Cervical back: Normal range of motion and neck supple. No tenderness.      Right lower leg: No edema.      Left lower leg: No edema.   Skin:     General: Skin is warm and dry.      Capillary Refill: Capillary refill takes less than 2 seconds.      Coloration: Skin is not jaundiced or pale.      Findings: No bruising, erythema, lesion or rash.   Neurological:      General: No focal deficit present.      Mental Status: She is alert and oriented to person, place, and time.      Sensory: No sensory deficit.   Psychiatric:         Mood and Affect: Mood normal.         DIAGNOSTIC RESULTS     EKG: All EKG's are interpreted by the Emergency Department Physician who either signs or Co-signs this chart in the absence of a cardiologist.        RADIOLOGY:   Non-plain film images such as

## 2024-04-02 NOTE — DISCHARGE INSTRUCTIONS
Tylenol for mild pain  Norco 5 mg325 mg 1 by mouth every 8-12 hours for more sever pain  Continue home muscle relaxant.

## 2024-05-01 RX ORDER — ATORVASTATIN CALCIUM 20 MG/1
20 TABLET, FILM COATED ORAL DAILY
Qty: 90 TABLET | Refills: 3 | Status: SHIPPED | OUTPATIENT
Start: 2024-05-01

## 2024-05-21 RX ORDER — ATORVASTATIN CALCIUM 20 MG/1
20 TABLET, FILM COATED ORAL DAILY
Qty: 90 TABLET | Refills: 3 | Status: SHIPPED | OUTPATIENT
Start: 2024-05-21

## 2024-05-29 RX ORDER — LANOLIN ALCOHOL/MO/W.PET/CERES
CREAM (GRAM) TOPICAL
Qty: 180 TABLET | Refills: 3 | Status: SHIPPED | OUTPATIENT
Start: 2024-05-29

## 2024-05-30 RX ORDER — POTASSIUM CHLORIDE 1500 MG/1
20 TABLET, EXTENDED RELEASE ORAL DAILY
Qty: 90 TABLET | Refills: 3 | Status: SHIPPED | OUTPATIENT
Start: 2024-05-30

## 2024-06-04 RX ORDER — DILTIAZEM HYDROCHLORIDE 120 MG/1
CAPSULE, COATED, EXTENDED RELEASE ORAL
Qty: 180 CAPSULE | Refills: 3 | Status: SHIPPED | OUTPATIENT
Start: 2024-06-04

## 2024-08-01 RX ORDER — DILTIAZEM HYDROCHLORIDE 120 MG/1
120 CAPSULE, COATED, EXTENDED RELEASE ORAL 2 TIMES DAILY
Qty: 180 CAPSULE | Refills: 3 | Status: SHIPPED | OUTPATIENT
Start: 2024-08-01

## 2024-08-04 ENCOUNTER — HOSPITAL ENCOUNTER (EMERGENCY)
Age: 69
Discharge: HOME | End: 2024-08-04
Attending: EMERGENCY MEDICINE
Payer: MEDICARE

## 2024-08-04 ENCOUNTER — APPOINTMENT (OUTPATIENT)
Dept: CT IMAGING | Age: 69
End: 2024-08-04
Payer: MEDICARE

## 2024-08-04 VITALS
DIASTOLIC BLOOD PRESSURE: 62 MMHG | TEMPERATURE: 98.4 F | SYSTOLIC BLOOD PRESSURE: 133 MMHG | RESPIRATION RATE: 18 BRPM | OXYGEN SATURATION: 95 % | HEART RATE: 78 BPM | HEIGHT: 66 IN | WEIGHT: 225 LBS | BODY MASS INDEX: 36.16 KG/M2

## 2024-08-04 DIAGNOSIS — S39.012A LUMBOSACRAL STRAIN, INITIAL ENCOUNTER: Primary | ICD-10-CM

## 2024-08-04 LAB
ANION GAP SERPL CALCULATED.3IONS-SCNC: 13 MMOL/L (ref 9–17)
BACTERIA URNS QL MICRO: ABNORMAL
BASOPHILS # BLD: 0.06 K/UL (ref 0–0.2)
BASOPHILS NFR BLD: 1 % (ref 0–2)
BILIRUB UR QL STRIP: NEGATIVE
BUN SERPL-MCNC: 13 MG/DL (ref 8–23)
BUN/CREAT SERPL: 13 (ref 9–20)
CALCIUM SERPL-MCNC: 9.5 MG/DL (ref 8.6–10.4)
CHLORIDE SERPL-SCNC: 102 MMOL/L (ref 98–107)
CLARITY UR: CLEAR
CO2 SERPL-SCNC: 24 MMOL/L (ref 20–31)
COLOR UR: YELLOW
CREAT SERPL-MCNC: 1 MG/DL (ref 0.5–0.9)
EOSINOPHIL # BLD: 0.21 K/UL (ref 0–0.44)
EOSINOPHILS RELATIVE PERCENT: 2 % (ref 1–4)
EPI CELLS #/AREA URNS HPF: ABNORMAL /HPF (ref 0–25)
ERYTHROCYTE [DISTWIDTH] IN BLOOD BY AUTOMATED COUNT: 12.2 % (ref 11.8–14.4)
GFR, ESTIMATED: 61 ML/MIN/1.73M2
GLUCOSE SERPL-MCNC: 96 MG/DL (ref 70–99)
GLUCOSE UR STRIP-MCNC: NEGATIVE MG/DL
HCT VFR BLD AUTO: 42.6 % (ref 36.3–47.1)
HGB BLD-MCNC: 14.5 G/DL (ref 11.9–15.1)
HGB UR QL STRIP.AUTO: ABNORMAL
IMM GRANULOCYTES # BLD AUTO: <0.03 K/UL (ref 0–0.3)
IMM GRANULOCYTES NFR BLD: 0 %
KETONES UR STRIP-MCNC: NEGATIVE MG/DL
LEUKOCYTE ESTERASE UR QL STRIP: NEGATIVE
LYMPHOCYTES NFR BLD: 2.69 K/UL (ref 1.1–3.7)
LYMPHOCYTES RELATIVE PERCENT: 31 % (ref 24–43)
MCH RBC QN AUTO: 31.6 PG (ref 25.2–33.5)
MCHC RBC AUTO-ENTMCNC: 34 G/DL (ref 28.4–34.8)
MCV RBC AUTO: 92.8 FL (ref 82.6–102.9)
MONOCYTES NFR BLD: 0.67 K/UL (ref 0.1–1.2)
MONOCYTES NFR BLD: 8 % (ref 3–12)
NEUTROPHILS NFR BLD: 58 % (ref 36–65)
NEUTS SEG NFR BLD: 5.08 K/UL (ref 1.5–8.1)
NITRITE UR QL STRIP: NEGATIVE
NRBC BLD-RTO: 0 PER 100 WBC
PH UR STRIP: 7 [PH] (ref 5–9)
PLATELET # BLD AUTO: 345 K/UL (ref 138–453)
PMV BLD AUTO: 9.6 FL (ref 8.1–13.5)
POTASSIUM SERPL-SCNC: 4.2 MMOL/L (ref 3.7–5.3)
PROT UR STRIP-MCNC: NEGATIVE MG/DL
RBC # BLD AUTO: 4.59 M/UL (ref 3.95–5.11)
RBC #/AREA URNS HPF: ABNORMAL /HPF (ref 0–2)
SODIUM SERPL-SCNC: 139 MMOL/L (ref 135–144)
SP GR UR STRIP: 1.01 (ref 1.01–1.02)
UROBILINOGEN UR STRIP-ACNC: NORMAL EU/DL (ref 0–1)
WBC #/AREA URNS HPF: ABNORMAL /HPF (ref 0–5)
WBC OTHER # BLD: 8.7 K/UL (ref 3.5–11.3)

## 2024-08-04 PROCEDURE — 81001 URINALYSIS AUTO W/SCOPE: CPT

## 2024-08-04 PROCEDURE — 99284 EMERGENCY DEPT VISIT MOD MDM: CPT

## 2024-08-04 PROCEDURE — 85025 COMPLETE CBC W/AUTO DIFF WBC: CPT

## 2024-08-04 PROCEDURE — 80048 BASIC METABOLIC PNL TOTAL CA: CPT

## 2024-08-04 PROCEDURE — 6360000002 HC RX W HCPCS: Performed by: EMERGENCY MEDICINE

## 2024-08-04 PROCEDURE — 96375 TX/PRO/DX INJ NEW DRUG ADDON: CPT

## 2024-08-04 PROCEDURE — 74176 CT ABD & PELVIS W/O CONTRAST: CPT

## 2024-08-04 PROCEDURE — 96374 THER/PROPH/DIAG INJ IV PUSH: CPT

## 2024-08-04 PROCEDURE — 2580000003 HC RX 258: Performed by: EMERGENCY MEDICINE

## 2024-08-04 RX ORDER — KETOROLAC TROMETHAMINE 10 MG/1
10 TABLET, FILM COATED ORAL EVERY 8 HOURS PRN
Qty: 15 TABLET | Refills: 0 | Status: SHIPPED | OUTPATIENT
Start: 2024-08-04

## 2024-08-04 RX ORDER — BACLOFEN 10 MG/1
20 TABLET ORAL 3 TIMES DAILY
Qty: 20 TABLET | Refills: 0 | Status: SHIPPED | OUTPATIENT
Start: 2024-08-04

## 2024-08-04 RX ORDER — 0.9 % SODIUM CHLORIDE 0.9 %
1000 INTRAVENOUS SOLUTION INTRAVENOUS ONCE
Status: COMPLETED | OUTPATIENT
Start: 2024-08-04 | End: 2024-08-04

## 2024-08-04 RX ORDER — LIDOCAINE 4 G/G
1 PATCH TOPICAL DAILY
Qty: 30 PATCH | Refills: 0 | Status: SHIPPED | OUTPATIENT
Start: 2024-08-04 | End: 2024-09-03

## 2024-08-04 RX ORDER — ONDANSETRON 2 MG/ML
4 INJECTION INTRAMUSCULAR; INTRAVENOUS ONCE
Status: COMPLETED | OUTPATIENT
Start: 2024-08-04 | End: 2024-08-04

## 2024-08-04 RX ORDER — KETOROLAC TROMETHAMINE 15 MG/ML
15 INJECTION, SOLUTION INTRAMUSCULAR; INTRAVENOUS ONCE
Status: COMPLETED | OUTPATIENT
Start: 2024-08-04 | End: 2024-08-04

## 2024-08-04 RX ADMIN — ONDANSETRON 4 MG: 2 INJECTION INTRAMUSCULAR; INTRAVENOUS at 19:49

## 2024-08-04 RX ADMIN — KETOROLAC TROMETHAMINE 15 MG: 15 INJECTION, SOLUTION INTRAMUSCULAR; INTRAVENOUS at 19:49

## 2024-08-04 RX ADMIN — SODIUM CHLORIDE 1000 ML: 9 INJECTION, SOLUTION INTRAVENOUS at 19:49

## 2024-08-04 ASSESSMENT — PAIN - FUNCTIONAL ASSESSMENT: PAIN_FUNCTIONAL_ASSESSMENT: 0-10

## 2024-08-04 ASSESSMENT — PAIN DESCRIPTION - PAIN TYPE: TYPE: ACUTE PAIN

## 2024-08-04 ASSESSMENT — PAIN SCALES - GENERAL
PAINLEVEL_OUTOF10: 8
PAINLEVEL_OUTOF10: 5

## 2024-08-04 ASSESSMENT — PAIN DESCRIPTION - LOCATION: LOCATION: FLANK

## 2024-08-04 ASSESSMENT — PAIN DESCRIPTION - ORIENTATION: ORIENTATION: LEFT

## 2024-08-04 NOTE — ED PROVIDER NOTES
HPI:  8/4/24,   Time: 7:24 PM EDT         Mala Brannon is a 69 y.o. female presenting to the ED for gradual onset of progressive left flank pain, beginning since yesterday ago.  The complaint has been constant, moderate in severity, and worsened by nothing.  No alleviating factors no fever chills or fatigue and no nausea or vomiting or diarrhea    ROS:   Pertinent positives and negatives are stated within HPI, all other systems reviewed and are negative.  --------------------------------------------- PAST HISTORY ---------------------------------------------  Past Medical History:  has a past medical history of Class 1 obesity due to excess calories with body mass index (BMI) of 33.0 to 33.9 in adult, Fibromyalgia, Fibromyalgia, Former smoker, History of echocardiogram, History of stress test, Hypothyroidism, Mixed hyperlipidemia, BRAULIO (obstructive sleep apnea), PAF (paroxysmal atrial fibrillation) ([facility]), and Primary hypertension.    Past Surgical History:  has a past surgical history that includes Tubal ligation; Rotator cuff repair (2011); Hand surgery (2011); Colonoscopy (2009); and Tonsillectomy.    Social History:  reports that she quit smoking about 27 years ago. Her smoking use included cigarettes. She started smoking about 47 years ago. She has a 5.0 pack-year smoking history. She has never used smokeless tobacco. She reports current alcohol use. She reports that she does not use drugs.    Family History: family history includes Alzheimer's Disease in her mother; Atrial Fibrillation in her brother and sister; Diabetes in her father; Heart Attack in her maternal grandmother; Heart Failure in her mother; Liver Disease in her father; Lung Cancer in her maternal grandmother; Lymphoma in her brother; Uterine Cancer in her sister.     The patient’s home medications have been reviewed.    Allergies: Patient has no known allergies.    -------------------------------------------------- RESULTS

## 2024-10-10 ENCOUNTER — HOSPITAL ENCOUNTER (OUTPATIENT)
Age: 69
Discharge: HOME | End: 2024-10-10
Payer: MEDICARE

## 2024-10-10 ENCOUNTER — OFFICE VISIT (OUTPATIENT)
Dept: CARDIOLOGY | Age: 69
End: 2024-10-10
Payer: MEDICARE

## 2024-10-10 VITALS
HEART RATE: 63 BPM | RESPIRATION RATE: 18 BRPM | BODY MASS INDEX: 36.64 KG/M2 | DIASTOLIC BLOOD PRESSURE: 64 MMHG | OXYGEN SATURATION: 99 % | HEIGHT: 66 IN | SYSTOLIC BLOOD PRESSURE: 102 MMHG | WEIGHT: 228 LBS

## 2024-10-10 DIAGNOSIS — Z79.01 CHRONIC ANTICOAGULATION: ICD-10-CM

## 2024-10-10 DIAGNOSIS — E78.2 MIXED HYPERLIPIDEMIA: ICD-10-CM

## 2024-10-10 DIAGNOSIS — I10 PRIMARY HYPERTENSION: ICD-10-CM

## 2024-10-10 DIAGNOSIS — G47.33 OSA (OBSTRUCTIVE SLEEP APNEA): ICD-10-CM

## 2024-10-10 DIAGNOSIS — Z86.79 S/P ABLATION OF ATRIAL FIBRILLATION: ICD-10-CM

## 2024-10-10 DIAGNOSIS — Z98.890 S/P ABLATION OF ATRIAL FIBRILLATION: ICD-10-CM

## 2024-10-10 DIAGNOSIS — I25.10 ASHD (ARTERIOSCLEROTIC HEART DISEASE): ICD-10-CM

## 2024-10-10 DIAGNOSIS — I48.0 PAF (PAROXYSMAL ATRIAL FIBRILLATION) (HCC): Primary | ICD-10-CM

## 2024-10-10 DIAGNOSIS — E88.810 METABOLIC SYNDROME: ICD-10-CM

## 2024-10-10 DIAGNOSIS — R73.9 HYPERGLYCEMIA: ICD-10-CM

## 2024-10-10 DIAGNOSIS — I48.0 PAF (PAROXYSMAL ATRIAL FIBRILLATION) (HCC): ICD-10-CM

## 2024-10-10 LAB
ALT SERPL-CCNC: 31 U/L (ref 10–35)
ANION GAP SERPL CALCULATED.3IONS-SCNC: 10 MMOL/L (ref 9–16)
AST SERPL-CCNC: 28 U/L (ref 10–35)
BUN SERPL-MCNC: 15 MG/DL (ref 8–23)
BUN/CREAT SERPL: 14 (ref 9–20)
CALCIUM SERPL-MCNC: 9.5 MG/DL (ref 8.6–10.4)
CHLORIDE SERPL-SCNC: 105 MMOL/L (ref 98–107)
CHOLEST SERPL-MCNC: 156 MG/DL (ref 0–199)
CHOLESTEROL/HDL RATIO: 3
CO2 SERPL-SCNC: 26 MMOL/L (ref 20–31)
CREAT SERPL-MCNC: 1.1 MG/DL (ref 0.5–0.9)
ERYTHROCYTE [DISTWIDTH] IN BLOOD BY AUTOMATED COUNT: 12.7 % (ref 11.8–14.4)
EST. AVERAGE GLUCOSE BLD GHB EST-MCNC: 126 MG/DL
GFR, ESTIMATED: 56 ML/MIN/1.73M2
GLUCOSE SERPL-MCNC: 88 MG/DL (ref 74–99)
HBA1C MFR BLD: 6 % (ref 4–6)
HCT VFR BLD AUTO: 39.9 % (ref 36.3–47.1)
HDLC SERPL-MCNC: 51 MG/DL
HGB BLD-MCNC: 13.1 G/DL (ref 11.9–15.1)
LDLC SERPL CALC-MCNC: 66 MG/DL (ref 0–100)
MCH RBC QN AUTO: 31.7 PG (ref 25.2–33.5)
MCHC RBC AUTO-ENTMCNC: 32.8 G/DL (ref 28.4–34.8)
MCV RBC AUTO: 96.6 FL (ref 82.6–102.9)
NRBC BLD-RTO: 0 PER 100 WBC
PLATELET # BLD AUTO: 302 K/UL (ref 138–453)
PMV BLD AUTO: 9.3 FL (ref 8.1–13.5)
POTASSIUM SERPL-SCNC: 4.4 MMOL/L (ref 3.7–5.3)
RBC # BLD AUTO: 4.13 M/UL (ref 3.95–5.11)
SODIUM SERPL-SCNC: 141 MMOL/L (ref 136–145)
T4 FREE SERPL-MCNC: 1.4 NG/DL (ref 0.92–1.68)
TRIGL SERPL-MCNC: 200 MG/DL
TSH SERPL DL<=0.05 MIU/L-ACNC: 4.5 UIU/ML (ref 0.27–4.2)
VLDLC SERPL CALC-MCNC: 40 MG/DL
WBC OTHER # BLD: 6.8 K/UL (ref 3.5–11.3)

## 2024-10-10 PROCEDURE — G8427 DOCREV CUR MEDS BY ELIG CLIN: HCPCS | Performed by: INTERNAL MEDICINE

## 2024-10-10 PROCEDURE — 3078F DIAST BP <80 MM HG: CPT | Performed by: INTERNAL MEDICINE

## 2024-10-10 PROCEDURE — 83036 HEMOGLOBIN GLYCOSYLATED A1C: CPT

## 2024-10-10 PROCEDURE — G8417 CALC BMI ABV UP PARAM F/U: HCPCS | Performed by: INTERNAL MEDICINE

## 2024-10-10 PROCEDURE — G8484 FLU IMMUNIZE NO ADMIN: HCPCS | Performed by: INTERNAL MEDICINE

## 2024-10-10 PROCEDURE — 93005 ELECTROCARDIOGRAM TRACING: CPT | Performed by: INTERNAL MEDICINE

## 2024-10-10 PROCEDURE — 3074F SYST BP LT 130 MM HG: CPT | Performed by: INTERNAL MEDICINE

## 2024-10-10 PROCEDURE — 84460 ALANINE AMINO (ALT) (SGPT): CPT

## 2024-10-10 PROCEDURE — 1090F PRES/ABSN URINE INCON ASSESS: CPT | Performed by: INTERNAL MEDICINE

## 2024-10-10 PROCEDURE — 3017F COLORECTAL CA SCREEN DOC REV: CPT | Performed by: INTERNAL MEDICINE

## 2024-10-10 PROCEDURE — 1036F TOBACCO NON-USER: CPT | Performed by: INTERNAL MEDICINE

## 2024-10-10 PROCEDURE — 80061 LIPID PANEL: CPT

## 2024-10-10 PROCEDURE — 99214 OFFICE O/P EST MOD 30 MIN: CPT | Performed by: INTERNAL MEDICINE

## 2024-10-10 PROCEDURE — 36415 COLL VENOUS BLD VENIPUNCTURE: CPT

## 2024-10-10 PROCEDURE — 84450 TRANSFERASE (AST) (SGOT): CPT

## 2024-10-10 PROCEDURE — 85027 COMPLETE CBC AUTOMATED: CPT

## 2024-10-10 PROCEDURE — 99211 OFF/OP EST MAY X REQ PHY/QHP: CPT | Performed by: INTERNAL MEDICINE

## 2024-10-10 PROCEDURE — G8400 PT W/DXA NO RESULTS DOC: HCPCS | Performed by: INTERNAL MEDICINE

## 2024-10-10 PROCEDURE — 1123F ACP DISCUSS/DSCN MKR DOCD: CPT | Performed by: INTERNAL MEDICINE

## 2024-10-10 PROCEDURE — 84439 ASSAY OF FREE THYROXINE: CPT

## 2024-10-10 PROCEDURE — 80048 BASIC METABOLIC PNL TOTAL CA: CPT

## 2024-10-10 PROCEDURE — 93010 ELECTROCARDIOGRAM REPORT: CPT | Performed by: INTERNAL MEDICINE

## 2024-10-10 PROCEDURE — 84443 ASSAY THYROID STIM HORMONE: CPT

## 2024-10-10 RX ORDER — DILTIAZEM HYDROCHLORIDE 120 MG/1
120 CAPSULE, COATED, EXTENDED RELEASE ORAL DAILY
Qty: 90 CAPSULE | Refills: 3 | Status: SHIPPED | OUTPATIENT
Start: 2024-10-10

## 2024-10-10 NOTE — PROGRESS NOTES
to better assess for the etiology of this problem and to help guide future management. I also ordered an A1C, an ALT and an AST.    Essential Hypertension: Controlled  Beta Blocker: STOP metoprolol tartrate (Lopressor)   ACE Inibitor/ARB: Not indicated at this time.   Calcium Channel Blocker: DECREASE diltiazem CD (Cardizem CD) to 120 mg daily  Diuretics: Not indicated at this time.     Hyperlipidemia: Mixed LDL 98 mg/dl on 11/29/2021  Cholesterol Reduction Therapy: Continue Atorvastatin (Lipitor) 20 mg daily. I discussed the potential benefits of statin therapy as well as the potential risks including myalgia as well as the rare but potentially serious complication of liver or kidney damage. Although rare, I told them that this could be serious and therefore told them to stop the medication immediately and call if they developed any severe muscle aches or pains and they agreed to do so.   I ordered a lipid panel to be done today.    Obesity: Body mass index is 36.8 kg/m².   I also briefly discussed both diet and exercise strategies for her to continue to lose weight.     Obstructive Sleep Apnea:   Moderate BRAULIO syndrome with AHI of 28  She is currently wearing her CPAP machine nightly.    In the meantime, I encouraged Ms. Brannon to continue to take her other medications.    FOLLOW UP:   I told Ms. Brannon to call my office if she had any problems, but otherwise I asked her to Return in about 1 year (around 10/10/2025). However, I would be happy to see her sooner should the need arise.     Sincerely,  Glen Lobato MD, F.A.C.C.  [facility] Cardiology Specialist    [address] [place] [id number]  Phone: [phone], Fax: [phone]     I believe that the risk of significant morbidity and mortality related to the patient's current medical conditions are: Intermediate. > 30  minutes were spent during prep work, discussion and exam of the patient, and follow up documentation and all of their

## 2024-10-11 ENCOUNTER — TELEPHONE (OUTPATIENT)
Dept: CARDIOLOGY | Age: 69
End: 2024-10-11

## 2024-10-11 NOTE — TELEPHONE ENCOUNTER
----- Message from Dr. Glen Lobato MD sent at 10/11/2024  2:36 PM EDT -----  Blood work is good.  Please have her follow-up with Rahel Gallagher MD because of thyroid function.  Please send a copy of this blood work to the primary care provider.  Thank you

## 2024-10-23 ENCOUNTER — HOSPITAL ENCOUNTER (OUTPATIENT)
Age: 69
Discharge: HOME | End: 2024-10-25
Attending: INTERNAL MEDICINE
Payer: MEDICARE

## 2024-10-23 VITALS
DIASTOLIC BLOOD PRESSURE: 64 MMHG | HEIGHT: 63 IN | WEIGHT: 227.96 LBS | BODY MASS INDEX: 40.39 KG/M2 | SYSTOLIC BLOOD PRESSURE: 102 MMHG

## 2024-10-23 DIAGNOSIS — I25.10 ASHD (ARTERIOSCLEROTIC HEART DISEASE): ICD-10-CM

## 2024-10-23 DIAGNOSIS — Z86.79 S/P ABLATION OF ATRIAL FIBRILLATION: ICD-10-CM

## 2024-10-23 DIAGNOSIS — I10 PRIMARY HYPERTENSION: ICD-10-CM

## 2024-10-23 DIAGNOSIS — E78.2 MIXED HYPERLIPIDEMIA: ICD-10-CM

## 2024-10-23 DIAGNOSIS — Z79.01 CHRONIC ANTICOAGULATION: ICD-10-CM

## 2024-10-23 DIAGNOSIS — Z98.890 S/P ABLATION OF ATRIAL FIBRILLATION: ICD-10-CM

## 2024-10-23 DIAGNOSIS — G47.33 OSA (OBSTRUCTIVE SLEEP APNEA): ICD-10-CM

## 2024-10-23 DIAGNOSIS — I48.0 PAF (PAROXYSMAL ATRIAL FIBRILLATION) (HCC): ICD-10-CM

## 2024-10-23 LAB
ECHO AO ASC DIAM: 3.5 CM
ECHO AO ASCENDING AORTA INDEX: 1.72 CM/M2
ECHO AO SINUS VALSALVA DIAM: 3.1 CM
ECHO AO SINUS VALSALVA INDEX: 1.52 CM/M2
ECHO AO ST JNCT DIAM: 2.7 CM
ECHO AV CUSP MM: 2 CM
ECHO AV MEAN GRADIENT: 5 MMHG
ECHO AV MEAN VELOCITY: 1 M/S
ECHO AV PEAK GRADIENT: 8 MMHG
ECHO AV PEAK VELOCITY: 1.4 M/S
ECHO AV VELOCITY RATIO: 0.86
ECHO AV VTI: 32.5 CM
ECHO BSA: 2.14 M2
ECHO LA AREA 2C: 19.3 CM2
ECHO LA AREA 4C: 17.9 CM2
ECHO LA MAJOR AXIS: 5 CM
ECHO LA MINOR AXIS: 5.5 CM
ECHO LA VOL BP: 57 ML (ref 22–52)
ECHO LA VOL MOD A2C: 56 ML (ref 22–52)
ECHO LA VOL MOD A4C: 52 ML (ref 22–52)
ECHO LA VOL/BSA BIPLANE: 28 ML/M2 (ref 16–34)
ECHO LA VOLUME INDEX MOD A2C: 27 ML/M2 (ref 16–34)
ECHO LA VOLUME INDEX MOD A4C: 25 ML/M2 (ref 16–34)
ECHO LV E' LATERAL VELOCITY: 7.3 CM/S
ECHO LV EDV A2C: 77 ML
ECHO LV EDV A4C: 77 ML
ECHO LV EDV INDEX A4C: 38 ML/M2
ECHO LV EDV NDEX A2C: 38 ML/M2
ECHO LV EJECTION FRACTION A2C: 67 %
ECHO LV EJECTION FRACTION A4C: 65 %
ECHO LV EJECTION FRACTION BIPLANE: 65 % (ref 55–100)
ECHO LV ESV A2C: 25 ML
ECHO LV ESV A4C: 27 ML
ECHO LV ESV INDEX A2C: 12 ML/M2
ECHO LV ESV INDEX A4C: 13 ML/M2
ECHO LV FRACTIONAL SHORTENING: 30 % (ref 28–44)
ECHO LV INTERNAL DIMENSION DIASTOLE INDEX: 2.45 CM/M2
ECHO LV INTERNAL DIMENSION DIASTOLIC: 5 CM (ref 3.9–5.3)
ECHO LV INTERNAL DIMENSION SYSTOLIC INDEX: 1.72 CM/M2
ECHO LV INTERNAL DIMENSION SYSTOLIC: 3.5 CM
ECHO LV IVSD: 1.2 CM (ref 0.6–0.9)
ECHO LV MASS 2D: 220.3 G (ref 67–162)
ECHO LV MASS INDEX 2D: 108 G/M2 (ref 43–95)
ECHO LV POSTERIOR WALL DIASTOLIC: 1.1 CM (ref 0.6–0.9)
ECHO LV RELATIVE WALL THICKNESS RATIO: 0.44
ECHO LVOT AV VTI INDEX: 0.84
ECHO LVOT MEAN GRADIENT: 3 MMHG
ECHO LVOT PEAK GRADIENT: 5 MMHG
ECHO LVOT PEAK VELOCITY: 1.2 M/S
ECHO LVOT VTI: 27.3 CM
ECHO MV A VELOCITY: 0.85 M/S
ECHO MV E DECELERATION TIME (DT): 254 MS
ECHO MV E VELOCITY: 1.01 M/S
ECHO MV E/A RATIO: 1.19
ECHO MV E/E' LATERAL: 13.84
ECHO PV MAX VELOCITY: 1 M/S
ECHO PV PEAK GRADIENT: 4 MMHG

## 2024-10-23 PROCEDURE — 93306 TTE W/DOPPLER COMPLETE: CPT

## 2024-10-24 ENCOUNTER — TELEPHONE (OUTPATIENT)
Dept: CARDIOLOGY | Age: 69
End: 2024-10-24

## 2024-10-24 NOTE — TELEPHONE ENCOUNTER
----- Message from Dr. Glen Lobato MD sent at 10/23/2024  8:57 PM EDT -----  Echo is good.  Heart function is normal.  Thank you

## 2024-12-19 RX ORDER — APIXABAN 5 MG/1
5 TABLET, FILM COATED ORAL 2 TIMES DAILY
Qty: 180 TABLET | Refills: 3 | Status: SHIPPED | OUTPATIENT
Start: 2024-12-19

## 2025-01-01 ENCOUNTER — APPOINTMENT (OUTPATIENT)
Dept: CT IMAGING | Age: 70
End: 2025-01-01
Payer: MEDICARE

## 2025-01-01 ENCOUNTER — HOSPITAL ENCOUNTER (EMERGENCY)
Age: 70
Discharge: HOME | End: 2025-01-01
Attending: EMERGENCY MEDICINE
Payer: MEDICARE

## 2025-01-01 VITALS
SYSTOLIC BLOOD PRESSURE: 148 MMHG | BODY MASS INDEX: 38.98 KG/M2 | WEIGHT: 220 LBS | OXYGEN SATURATION: 95 % | TEMPERATURE: 99 F | HEART RATE: 82 BPM | RESPIRATION RATE: 18 BRPM | DIASTOLIC BLOOD PRESSURE: 73 MMHG

## 2025-01-01 DIAGNOSIS — R11.2 NAUSEA AND VOMITING, UNSPECIFIED VOMITING TYPE: Primary | ICD-10-CM

## 2025-01-01 DIAGNOSIS — R19.7 DIARRHEA, UNSPECIFIED TYPE: ICD-10-CM

## 2025-01-01 LAB
ALBUMIN SERPL-MCNC: 4.3 G/DL (ref 3.5–5.2)
ALBUMIN/GLOB SERPL: 1.3 {RATIO} (ref 1–2.5)
ALP SERPL-CCNC: 83 U/L (ref 35–104)
ALT SERPL-CCNC: 40 U/L (ref 10–35)
ANION GAP SERPL CALCULATED.3IONS-SCNC: 13 MMOL/L (ref 9–16)
AST SERPL-CCNC: 38 U/L (ref 10–35)
BACTERIA URNS QL MICRO: ABNORMAL
BASOPHILS # BLD: 0.03 K/UL (ref 0–0.2)
BASOPHILS NFR BLD: 1 % (ref 0–2)
BILIRUB SERPL-MCNC: 0.4 MG/DL (ref 0–1.2)
BILIRUB UR QL STRIP: NEGATIVE
BUN SERPL-MCNC: 16 MG/DL (ref 8–23)
BUN/CREAT SERPL: 15 (ref 9–20)
CALCIUM SERPL-MCNC: 8.7 MG/DL (ref 8.6–10.4)
CHARACTER UR: ABNORMAL
CHLORIDE SERPL-SCNC: 106 MMOL/L (ref 98–107)
CLARITY UR: CLEAR
CO2 SERPL-SCNC: 19 MMOL/L (ref 20–31)
COLOR UR: YELLOW
CREAT SERPL-MCNC: 1.1 MG/DL (ref 0.5–0.9)
EOSINOPHIL # BLD: 0.08 K/UL (ref 0–0.44)
EOSINOPHILS RELATIVE PERCENT: 1 % (ref 1–4)
EPI CELLS #/AREA URNS HPF: ABNORMAL /HPF (ref 0–25)
ERYTHROCYTE [DISTWIDTH] IN BLOOD BY AUTOMATED COUNT: 12.5 % (ref 11.8–14.4)
GFR, ESTIMATED: 56 ML/MIN/1.73M2
GLUCOSE SERPL-MCNC: 109 MG/DL (ref 74–99)
GLUCOSE UR STRIP-MCNC: NEGATIVE MG/DL
HCT VFR BLD AUTO: 44 % (ref 36.3–47.1)
HGB BLD-MCNC: 14.9 G/DL (ref 11.9–15.1)
HGB UR QL STRIP.AUTO: ABNORMAL
IMM GRANULOCYTES # BLD AUTO: <0.03 K/UL (ref 0–0.3)
IMM GRANULOCYTES NFR BLD: 0 %
KETONES UR STRIP-MCNC: ABNORMAL MG/DL
LEUKOCYTE ESTERASE UR QL STRIP: ABNORMAL
LIPASE SERPL-CCNC: 65 U/L (ref 13–60)
LYMPHOCYTES NFR BLD: 2.09 K/UL (ref 1.1–3.7)
LYMPHOCYTES RELATIVE PERCENT: 37 % (ref 24–43)
MCH RBC QN AUTO: 31.8 PG (ref 25.2–33.5)
MCHC RBC AUTO-ENTMCNC: 33.9 G/DL (ref 28.4–34.8)
MCV RBC AUTO: 93.8 FL (ref 82.6–102.9)
MONOCYTES NFR BLD: 0.71 K/UL (ref 0.1–1.2)
MONOCYTES NFR BLD: 13 % (ref 3–12)
MUCOUS THREADS URNS QL MICRO: ABNORMAL
NEUTROPHILS NFR BLD: 48 % (ref 36–65)
NEUTS SEG NFR BLD: 2.78 K/UL (ref 1.5–8.1)
NITRITE UR QL STRIP: NEGATIVE
NRBC BLD-RTO: 0 PER 100 WBC
PH UR STRIP: 6 [PH] (ref 5–9)
PLATELET # BLD AUTO: 314 K/UL (ref 138–453)
PMV BLD AUTO: 10 FL (ref 8.1–13.5)
POTASSIUM SERPL-SCNC: 3.6 MMOL/L (ref 3.7–5.3)
PROT SERPL-MCNC: 7.5 G/DL (ref 6.6–8.7)
PROT UR STRIP-MCNC: ABNORMAL MG/DL
RBC # BLD AUTO: 4.69 M/UL (ref 3.95–5.11)
RBC #/AREA URNS HPF: ABNORMAL /HPF (ref 0–2)
SODIUM SERPL-SCNC: 138 MMOL/L (ref 136–145)
SP GR UR STRIP: 1.01 (ref 1.01–1.02)
UROBILINOGEN UR STRIP-ACNC: NORMAL EU/DL (ref 0–1)
WBC #/AREA URNS HPF: ABNORMAL /HPF (ref 0–5)
WBC OTHER # BLD: 5.7 K/UL (ref 3.5–11.3)

## 2025-01-01 PROCEDURE — 83690 ASSAY OF LIPASE: CPT

## 2025-01-01 PROCEDURE — 2580000003 HC RX 258: Performed by: PHYSICIAN ASSISTANT

## 2025-01-01 PROCEDURE — 96374 THER/PROPH/DIAG INJ IV PUSH: CPT

## 2025-01-01 PROCEDURE — 96372 THER/PROPH/DIAG INJ SC/IM: CPT

## 2025-01-01 PROCEDURE — 80053 COMPREHEN METABOLIC PANEL: CPT

## 2025-01-01 PROCEDURE — 6360000004 HC RX CONTRAST MEDICATION: Performed by: EMERGENCY MEDICINE

## 2025-01-01 PROCEDURE — 6360000002 HC RX W HCPCS: Performed by: PHYSICIAN ASSISTANT

## 2025-01-01 PROCEDURE — 81001 URINALYSIS AUTO W/SCOPE: CPT

## 2025-01-01 PROCEDURE — 74177 CT ABD & PELVIS W/CONTRAST: CPT

## 2025-01-01 PROCEDURE — 99285 EMERGENCY DEPT VISIT HI MDM: CPT

## 2025-01-01 PROCEDURE — 85025 COMPLETE CBC W/AUTO DIFF WBC: CPT

## 2025-01-01 RX ORDER — IOPAMIDOL 755 MG/ML
75 INJECTION, SOLUTION INTRAVASCULAR
Status: COMPLETED | OUTPATIENT
Start: 2025-01-01 | End: 2025-01-01

## 2025-01-01 RX ORDER — 0.9 % SODIUM CHLORIDE 0.9 %
1000 INTRAVENOUS SOLUTION INTRAVENOUS ONCE
Status: COMPLETED | OUTPATIENT
Start: 2025-01-01 | End: 2025-01-01

## 2025-01-01 RX ORDER — DICYCLOMINE HYDROCHLORIDE 10 MG/ML
20 INJECTION INTRAMUSCULAR ONCE
Status: COMPLETED | OUTPATIENT
Start: 2025-01-01 | End: 2025-01-01

## 2025-01-01 RX ORDER — ONDANSETRON 2 MG/ML
4 INJECTION INTRAMUSCULAR; INTRAVENOUS ONCE
Status: COMPLETED | OUTPATIENT
Start: 2025-01-01 | End: 2025-01-01

## 2025-01-01 RX ORDER — DICYCLOMINE HCL 20 MG
20 TABLET ORAL 4 TIMES DAILY
Qty: 20 TABLET | Refills: 0 | Status: SHIPPED | OUTPATIENT
Start: 2025-01-01 | End: 2025-01-06

## 2025-01-01 RX ORDER — ONDANSETRON 4 MG/1
4 TABLET, ORALLY DISINTEGRATING ORAL 3 TIMES DAILY PRN
Qty: 21 TABLET | Refills: 0 | Status: SHIPPED | OUTPATIENT
Start: 2025-01-01

## 2025-01-01 RX ADMIN — ONDANSETRON 4 MG: 2 INJECTION, SOLUTION INTRAMUSCULAR; INTRAVENOUS at 11:13

## 2025-01-01 RX ADMIN — DICYCLOMINE HYDROCHLORIDE 20 MG: 10 INJECTION, SOLUTION INTRAMUSCULAR at 11:16

## 2025-01-01 RX ADMIN — SODIUM CHLORIDE 1000 ML: 9 INJECTION, SOLUTION INTRAVENOUS at 11:21

## 2025-01-01 RX ADMIN — IOPAMIDOL 75 ML: 755 INJECTION, SOLUTION INTRAVENOUS at 13:26

## 2025-01-01 ASSESSMENT — LIFESTYLE VARIABLES
HOW MANY STANDARD DRINKS CONTAINING ALCOHOL DO YOU HAVE ON A TYPICAL DAY: 1 OR 2
HOW OFTEN DO YOU HAVE A DRINK CONTAINING ALCOHOL: 2-4 TIMES A MONTH

## 2025-01-01 ASSESSMENT — PAIN SCALES - GENERAL
PAINLEVEL_OUTOF10: 9
PAINLEVEL_OUTOF10: 4
PAINLEVEL_OUTOF10: 7
PAINLEVEL_OUTOF10: 9

## 2025-01-01 ASSESSMENT — PAIN DESCRIPTION - PAIN TYPE
TYPE: ACUTE PAIN
TYPE: ACUTE PAIN

## 2025-01-01 ASSESSMENT — PAIN DESCRIPTION - LOCATION
LOCATION: ABDOMEN

## 2025-01-01 ASSESSMENT — PAIN DESCRIPTION - ONSET
ONSET: SUDDEN
ONSET: SUDDEN

## 2025-01-01 ASSESSMENT — PAIN DESCRIPTION - DESCRIPTORS
DESCRIPTORS: BURNING

## 2025-01-01 ASSESSMENT — PAIN DESCRIPTION - FREQUENCY
FREQUENCY: CONTINUOUS
FREQUENCY: CONTINUOUS

## 2025-01-01 ASSESSMENT — PAIN DESCRIPTION - ORIENTATION
ORIENTATION: MID;LOWER;UPPER
ORIENTATION: MID
ORIENTATION: UPPER;LOWER

## 2025-01-01 NOTE — ED PROVIDER NOTES
EMERGENCY DEPARTMENT ENCOUNTER   ATTENDING ATTESTATION     Pt Name: Mala Brannon  MRN: [id number]  Birthdate 1955  Date of evaluation: 1/1/25   Mala Brannon is a 69 y.o. female with CC: Diarrhea (Onset Sunday- ) and Abdominal Pain (Onset Sunday to upper and lower abd. She reports emesis x1 on Sunday. She denies known sick contacts. )    MDM:   I performed a substantive part of the MDM during the patient's E/M visit. I personally made or approved the documented management plan and acknowledge its risk of complications.    Independent Interpretation of EKG: My (EKG/X-Ray/US/CT interpretation *    Discussion: Management/test interpretation discussed with *           CRITICAL CARE:           RADIOLOGY:All plain film, CT, MRI, and formal ultrasound images (except ED bedside ultrasound) are read by the radiologist, see reports below, unless otherwise noted in MDM or here.  CT ABDOMEN PELVIS W IV CONTRAST Additional Contrast? None   Preliminary Result   1. Slightly striated appearance of the kidneys,, nonspecific and may be   related to pyelonephritis.  Recommend correlation with urinalysis.   2. Otherwise no acute abdominal or pelvic abnormality.           LABS: All lab results were reviewed by myself, and all abnormals are listed below.  Labs Reviewed   CBC WITH AUTO DIFFERENTIAL - Abnormal; Notable for the following components:       Result Value    Monocytes % 13 (*)     All other components within normal limits   COMPREHENSIVE METABOLIC PANEL - Abnormal; Notable for the following components:    Potassium 3.6 (*)     CO2 19 (*)     Glucose 109 (*)     Creatinine 1.1 (*)     Est, Glom Filt Rate 56 (*)     ALT 40 (*)     AST 38 (*)     All other components within normal limits   URINALYSIS - Abnormal; Notable for the following components:    Ketones, Urine TRACE (*)     Urine Hgb 2+ (*)     Protein, UA TRACE (*)     Leukocyte Esterase, Urine TRACE (*)     All other components within normal limits   LIPASE -

## 2025-01-01 NOTE — ED PROVIDER NOTES
Emergency Department Encounter     Chief Complaint  Chief Complaint   Patient presents with    Diarrhea     Onset Sunday-     Abdominal Pain     Onset Sunday to upper and lower abd. She reports emesis x1 on Sunday. She denies known sick contacts.         HPI  This is a 69-year-old female states that for the past 4 days she has had upper abdominal cramping vomiting on Sunday no vomiting since then but has been very nauseated multiple episodes of watery stool without blood or mucus for the past several days.  History of intermittent atrial fibrillation the patient does take Eliquis but has not noticed any bleeding.  Denies fevers chills or urinary symptoms.           Past Medical History  Past Medical History:   Diagnosis Date    Class 1 obesity due to excess calories with body mass index (BMI) of 33.0 to 33.9 in adult     Fibromyalgia     Fibromyalgia     Former smoker     History of echocardiogram 07/2021    History of stress test 07/2021    Hypothyroidism     Mixed hyperlipidemia     BRAULIO (obstructive sleep apnea)     PAF (paroxysmal atrial fibrillation) (HCC)     Primary hypertension         Surgical History  Past Surgical History:   Procedure Laterality Date    COLONOSCOPY  2009    HAND SURGERY  2011    carpel tunnel    ROTATOR CUFF REPAIR  2011    TONSILLECTOMY      TUBAL LIGATION          Current Medications  Current Outpatient Rx   Medication Sig Dispense Refill    ondansetron (ZOFRAN-ODT) 4 MG disintegrating tablet Take 1 tablet by mouth 3 times daily as needed for Nausea or Vomiting 21 tablet 0    dicyclomine (BENTYL) 20 MG tablet Take 1 tablet by mouth 4 times daily for 5 days 20 tablet 0    ELIQUIS 5 MG TABS tablet TAKE 1 TABLET BY MOUTH TWICE A  tablet 3    dilTIAZem (CARDIZEM CD) 120 MG extended release capsule Take 1 capsule by mouth daily 90 capsule 3    KLOR-CON M20 20 MEQ extended release tablet TAKE 1 TABLET BY MOUTH EVERY DAY 90 tablet 3    magnesium oxide (MAG-OX) 400 (240 Mg) MG tablet TAKE

## 2025-03-21 ENCOUNTER — HOSPITAL ENCOUNTER
Dept: HOSPITAL 101 - LAB | Age: 70
Discharge: HOME | End: 2025-03-21
Payer: MEDICARE

## 2025-03-21 DIAGNOSIS — E03.9: Primary | ICD-10-CM

## 2025-03-21 LAB — THYROID STIMULATING HORMONE: 2.61 UIU/ML (ref 0.36–3.74)

## 2025-03-21 PROCEDURE — 84443 ASSAY THYROID STIM HORMONE: CPT

## 2025-03-21 PROCEDURE — 84439 ASSAY OF FREE THYROXINE: CPT

## 2025-03-21 PROCEDURE — 36415 COLL VENOUS BLD VENIPUNCTURE: CPT

## 2025-05-05 RX ORDER — POTASSIUM CHLORIDE 1500 MG/1
20 TABLET, EXTENDED RELEASE ORAL DAILY
Qty: 90 TABLET | Refills: 3 | Status: SHIPPED | OUTPATIENT
Start: 2025-05-05

## 2025-05-05 RX ORDER — LANOLIN ALCOHOL/MO/W.PET/CERES
400 CREAM (GRAM) TOPICAL 2 TIMES DAILY
Qty: 180 TABLET | Refills: 3 | Status: SHIPPED | OUTPATIENT
Start: 2025-05-05

## 2025-07-07 RX ORDER — ATORVASTATIN CALCIUM 20 MG/1
20 TABLET, FILM COATED ORAL DAILY
Qty: 90 TABLET | Refills: 3 | Status: SHIPPED | OUTPATIENT
Start: 2025-07-07